# Patient Record
Sex: FEMALE | Race: WHITE | NOT HISPANIC OR LATINO | Employment: OTHER | URBAN - METROPOLITAN AREA
[De-identification: names, ages, dates, MRNs, and addresses within clinical notes are randomized per-mention and may not be internally consistent; named-entity substitution may affect disease eponyms.]

---

## 2017-08-03 ENCOUNTER — TRANSCRIBE ORDERS (OUTPATIENT)
Dept: ADMINISTRATIVE | Facility: HOSPITAL | Age: 67
End: 2017-08-03

## 2017-08-03 DIAGNOSIS — R19.7 DIARRHEA DUE TO MALABSORPTION: ICD-10-CM

## 2017-08-03 DIAGNOSIS — K90.9 DIARRHEA DUE TO MALABSORPTION: ICD-10-CM

## 2017-08-11 ENCOUNTER — HOSPITAL ENCOUNTER (OUTPATIENT)
Dept: ULTRASOUND IMAGING | Facility: HOSPITAL | Age: 67
Discharge: HOME | End: 2017-08-11
Attending: INTERNAL MEDICINE | Admitting: INTERNAL MEDICINE

## 2017-08-11 ENCOUNTER — HOSPITAL ENCOUNTER (OUTPATIENT)
Dept: ULTRASOUND IMAGING | Facility: HOSPITAL | Age: 67
Discharge: HOME | End: 2017-08-11
Attending: INTERNAL MEDICINE

## 2017-08-11 ENCOUNTER — HOSPITAL ENCOUNTER (OUTPATIENT)
Dept: GENERAL RADIOLOGY | Facility: HOSPITAL | Age: 67
Discharge: HOME | End: 2017-08-11
Attending: INTERNAL MEDICINE

## 2017-08-11 DIAGNOSIS — R19.7 DIARRHEA DUE TO MALABSORPTION: ICD-10-CM

## 2017-08-11 DIAGNOSIS — K90.9 DIARRHEA DUE TO MALABSORPTION: ICD-10-CM

## 2017-08-11 PROCEDURE — A9270 NON-COVERED ITEM OR SERVICE: HCPCS | Performed by: INTERNAL MEDICINE

## 2017-08-11 PROCEDURE — 74250 X-RAY XM SM INT 1CNTRST STD: CPT

## 2017-08-11 PROCEDURE — 63710000001 BARIUM SULFATE 96 % RECONSTITUTED SUSPENSION: Performed by: INTERNAL MEDICINE

## 2017-08-11 PROCEDURE — 76700 US EXAM ABDOM COMPLETE: CPT

## 2017-08-11 RX ADMIN — BARIUM SULFATE 240 ML: 960 POWDER, FOR SUSPENSION ORAL at 09:20

## 2018-05-10 ENCOUNTER — HOSPITAL ENCOUNTER (OUTPATIENT)
Dept: GENERAL RADIOLOGY | Facility: HOSPITAL | Age: 68
Discharge: HOME | End: 2018-05-10
Attending: INTERNAL MEDICINE | Admitting: INTERNAL MEDICINE

## 2018-05-10 ENCOUNTER — TRANSCRIBE ORDERS (OUTPATIENT)
Dept: ADMINISTRATIVE | Facility: HOSPITAL | Age: 68
End: 2018-05-10

## 2018-05-10 DIAGNOSIS — R06.02 SOB (SHORTNESS OF BREATH): ICD-10-CM

## 2018-05-10 DIAGNOSIS — M79.604 RIGHT LEG PAIN: Primary | ICD-10-CM

## 2018-05-10 PROCEDURE — 72110 X-RAY EXAM L-2 SPINE 4/>VWS: CPT

## 2018-05-10 PROCEDURE — 71046 X-RAY EXAM CHEST 2 VIEWS: CPT

## 2019-01-15 ENCOUNTER — TRANSCRIBE ORDERS (OUTPATIENT)
Dept: ADMINISTRATIVE | Facility: HOSPITAL | Age: 69
End: 2019-01-15

## 2019-01-15 ENCOUNTER — HOSPITAL ENCOUNTER (OUTPATIENT)
Dept: GENERAL RADIOLOGY | Facility: HOSPITAL | Age: 69
Discharge: HOME | End: 2019-01-15
Attending: INTERNAL MEDICINE | Admitting: INTERNAL MEDICINE

## 2019-01-15 DIAGNOSIS — J01.90 ACUTE SINUSITIS, RECURRENCE NOT SPECIFIED, UNSPECIFIED LOCATION: Primary | ICD-10-CM

## 2019-01-15 PROCEDURE — 70220 X-RAY EXAM OF SINUSES: CPT

## 2019-01-22 ENCOUNTER — OUTSIDE FACILITY SERVICE (OUTPATIENT)
Dept: GASTROENTEROLOGY | Facility: CLINIC | Age: 69
End: 2019-01-22

## 2019-01-22 ENCOUNTER — LAB REQUISITION (OUTPATIENT)
Dept: LAB | Facility: HOSPITAL | Age: 69
End: 2019-01-22

## 2019-01-22 DIAGNOSIS — R63.4 ABNORMAL WEIGHT LOSS: ICD-10-CM

## 2019-01-22 PROCEDURE — 43239 EGD BIOPSY SINGLE/MULTIPLE: CPT | Performed by: INTERNAL MEDICINE

## 2019-01-22 PROCEDURE — 88305 TISSUE EXAM BY PATHOLOGIST: CPT | Performed by: INTERNAL MEDICINE

## 2019-01-22 PROCEDURE — 43249 ESOPH EGD DILATION <30 MM: CPT | Performed by: INTERNAL MEDICINE

## 2019-01-23 LAB
CYTO UR: NORMAL
LAB AP CASE REPORT: NORMAL
LAB AP CLINICAL INFORMATION: NORMAL
PATH REPORT.FINAL DX SPEC: NORMAL
PATH REPORT.GROSS SPEC: NORMAL

## 2019-01-28 ENCOUNTER — TRANSCRIBE ORDERS (OUTPATIENT)
Dept: ADMINISTRATIVE | Facility: HOSPITAL | Age: 69
End: 2019-01-28

## 2019-06-03 ENCOUNTER — TRANSCRIBE ORDERS (OUTPATIENT)
Dept: ADMINISTRATIVE | Facility: HOSPITAL | Age: 69
End: 2019-06-03

## 2019-06-03 DIAGNOSIS — I35.1 MILD AI (AORTIC INCOMPETENCE): Primary | ICD-10-CM

## 2019-06-18 ENCOUNTER — OUTSIDE FACILITY SERVICE (OUTPATIENT)
Dept: GASTROENTEROLOGY | Facility: CLINIC | Age: 69
End: 2019-06-18

## 2019-06-18 ENCOUNTER — LAB REQUISITION (OUTPATIENT)
Dept: LAB | Facility: HOSPITAL | Age: 69
End: 2019-06-18

## 2019-06-18 DIAGNOSIS — K25.9 GASTRIC ULCER WITHOUT HEMORRHAGE OR PERFORATION: ICD-10-CM

## 2019-06-18 PROCEDURE — 43239 EGD BIOPSY SINGLE/MULTIPLE: CPT | Performed by: INTERNAL MEDICINE

## 2019-06-18 PROCEDURE — 88305 TISSUE EXAM BY PATHOLOGIST: CPT | Performed by: INTERNAL MEDICINE

## 2019-07-02 ENCOUNTER — HOSPITAL ENCOUNTER (OUTPATIENT)
Dept: CARDIOLOGY | Facility: HOSPITAL | Age: 69
Discharge: HOME | End: 2019-07-02
Admitting: INTERNAL MEDICINE

## 2019-07-02 VITALS
WEIGHT: 145 LBS | SYSTOLIC BLOOD PRESSURE: 108 MMHG | BODY MASS INDEX: 24.16 KG/M2 | HEIGHT: 65 IN | DIASTOLIC BLOOD PRESSURE: 65 MMHG

## 2019-07-02 DIAGNOSIS — I35.1 MILD AI (AORTIC INCOMPETENCE): ICD-10-CM

## 2019-07-02 LAB
BH CV ECHO MEAS - AO MAX PG (FULL): 1.1 MMHG
BH CV ECHO MEAS - AO MAX PG: 8 MMHG
BH CV ECHO MEAS - AO MEAN PG (FULL): 1 MMHG
BH CV ECHO MEAS - AO MEAN PG: 4 MMHG
BH CV ECHO MEAS - AO ROOT AREA (BSA CORRECTED): 1.9
BH CV ECHO MEAS - AO ROOT AREA: 8 CM^2
BH CV ECHO MEAS - AO ROOT DIAM: 3.2 CM
BH CV ECHO MEAS - AO V2 MAX: 141 CM/SEC
BH CV ECHO MEAS - AO V2 MEAN: 96.5 CM/SEC
BH CV ECHO MEAS - AO V2 VTI: 33.8 CM
BH CV ECHO MEAS - ASC AORTA: 3 CM
BH CV ECHO MEAS - AVA(I,A): 2.8 CM^2
BH CV ECHO MEAS - AVA(I,D): 2.8 CM^2
BH CV ECHO MEAS - AVA(V,A): 2.9 CM^2
BH CV ECHO MEAS - AVA(V,D): 2.9 CM^2
BH CV ECHO MEAS - BSA(HAYCOCK): 1.7 M^2
BH CV ECHO MEAS - BSA: 1.7 M^2
BH CV ECHO MEAS - BZI_BMI: 24.1 KILOGRAMS/M^2
BH CV ECHO MEAS - BZI_METRIC_HEIGHT: 165.1 CM
BH CV ECHO MEAS - BZI_METRIC_WEIGHT: 65.8 KG
BH CV ECHO MEAS - EDV(CUBED): 57.1 ML
BH CV ECHO MEAS - EDV(MOD-SP2): 65 ML
BH CV ECHO MEAS - EDV(MOD-SP4): 62 ML
BH CV ECHO MEAS - EDV(TEICH): 63.9 ML
BH CV ECHO MEAS - EF(CUBED): 73.6 %
BH CV ECHO MEAS - EF(MOD-BP): 67 %
BH CV ECHO MEAS - EF(MOD-SP2): 64.6 %
BH CV ECHO MEAS - EF(MOD-SP4): 71 %
BH CV ECHO MEAS - EF(TEICH): 66.1 %
BH CV ECHO MEAS - ESV(CUBED): 15.1 ML
BH CV ECHO MEAS - ESV(MOD-SP2): 23 ML
BH CV ECHO MEAS - ESV(MOD-SP4): 18 ML
BH CV ECHO MEAS - ESV(TEICH): 21.7 ML
BH CV ECHO MEAS - FS: 35.8 %
BH CV ECHO MEAS - IVS/LVPW: 1.1
BH CV ECHO MEAS - IVSD: 0.99 CM
BH CV ECHO MEAS - LA DIMENSION: 3.8 CM
BH CV ECHO MEAS - LA/AO: 1.2
BH CV ECHO MEAS - LAD MAJOR: 5.4 CM
BH CV ECHO MEAS - LAT PEAK E' VEL: 8.1 CM/SEC
BH CV ECHO MEAS - LATERAL E/E' RATIO: 13.1
BH CV ECHO MEAS - LV DIASTOLIC VOL/BSA (35-75): 35.9 ML/M^2
BH CV ECHO MEAS - LV MASS(C)D: 113.8 GRAMS
BH CV ECHO MEAS - LV MASS(C)DI: 65.9 GRAMS/M^2
BH CV ECHO MEAS - LV MAX PG: 6.9 MMHG
BH CV ECHO MEAS - LV MEAN PG: 3 MMHG
BH CV ECHO MEAS - LV SYSTOLIC VOL/BSA (12-30): 10.4 ML/M^2
BH CV ECHO MEAS - LV V1 MAX: 131 CM/SEC
BH CV ECHO MEAS - LV V1 MEAN: 77 CM/SEC
BH CV ECHO MEAS - LV V1 VTI: 30 CM
BH CV ECHO MEAS - LVIDD: 3.9 CM
BH CV ECHO MEAS - LVIDS: 2.5 CM
BH CV ECHO MEAS - LVLD AP2: 7.3 CM
BH CV ECHO MEAS - LVLD AP4: 6 CM
BH CV ECHO MEAS - LVLS AP2: 5.8 CM
BH CV ECHO MEAS - LVLS AP4: 4.6 CM
BH CV ECHO MEAS - LVOT AREA (M): 3.1 CM^2
BH CV ECHO MEAS - LVOT AREA: 3.1 CM^2
BH CV ECHO MEAS - LVOT DIAM: 2 CM
BH CV ECHO MEAS - LVPWD: 0.94 CM
BH CV ECHO MEAS - MED PEAK E' VEL: 6.6 CM/SEC
BH CV ECHO MEAS - MEDIAL E/E' RATIO: 16.1
BH CV ECHO MEAS - MV A MAX VEL: 90.3 CM/SEC
BH CV ECHO MEAS - MV DEC TIME: 0.17 SEC
BH CV ECHO MEAS - MV E MAX VEL: 106 CM/SEC
BH CV ECHO MEAS - MV E/A: 1.2
BH CV ECHO MEAS - PA ACC SLOPE: 522 CM/SEC^2
BH CV ECHO MEAS - PA ACC TIME: 0.13 SEC
BH CV ECHO MEAS - PA MAX PG: 3 MMHG
BH CV ECHO MEAS - PA PR(ACCEL): 18.7 MMHG
BH CV ECHO MEAS - PA V2 MAX: 86.2 CM/SEC
BH CV ECHO MEAS - RAP SYSTOLE: 8 MMHG
BH CV ECHO MEAS - RVDD: 2 CM
BH CV ECHO MEAS - RVSP: 26 MMHG
BH CV ECHO MEAS - SI(AO): 157.5 ML/M^2
BH CV ECHO MEAS - SI(CUBED): 24.3 ML/M^2
BH CV ECHO MEAS - SI(LVOT): 54.6 ML/M^2
BH CV ECHO MEAS - SI(MOD-SP2): 24.3 ML/M^2
BH CV ECHO MEAS - SI(MOD-SP4): 25.5 ML/M^2
BH CV ECHO MEAS - SI(TEICH): 24.5 ML/M^2
BH CV ECHO MEAS - SV(AO): 271.8 ML
BH CV ECHO MEAS - SV(CUBED): 42 ML
BH CV ECHO MEAS - SV(LVOT): 94.2 ML
BH CV ECHO MEAS - SV(MOD-SP2): 42 ML
BH CV ECHO MEAS - SV(MOD-SP4): 44 ML
BH CV ECHO MEAS - SV(TEICH): 42.3 ML
BH CV ECHO MEAS - TAPSE (>1.6): 3.1 CM2
BH CV ECHO MEAS - TR MAX PG: 18 MMHG
BH CV ECHO MEAS - TR MAX VEL: 210.5 CM/SEC
BH CV ECHO MEAS - TV MAX PG: 0.98 MMHG
BH CV ECHO MEAS - TV V2 MAX: 49.4 CM/SEC
BH CV ECHO MEASUREMENTS AVERAGE E/E' RATIO: 14.42
BH CV VAS BP RIGHT ARM: NORMAL MMHG
BH CV XLRA - RV BASE: 2.9 CM
BH CV XLRA - RV LENGTH: 5.2 CM
BH CV XLRA - RV MID: 3 CM
BH CV XLRA - TDI S': 15.2 CM/SEC

## 2019-07-02 PROCEDURE — 93306 TTE W/DOPPLER COMPLETE: CPT | Performed by: INTERNAL MEDICINE

## 2019-07-02 PROCEDURE — 93306 TTE W/DOPPLER COMPLETE: CPT

## 2020-05-22 ENCOUNTER — TRANSCRIBE ORDERS (OUTPATIENT)
Dept: ADMINISTRATIVE | Facility: HOSPITAL | Age: 70
End: 2020-05-22

## 2020-05-22 ENCOUNTER — HOSPITAL ENCOUNTER (OUTPATIENT)
Dept: GENERAL RADIOLOGY | Facility: HOSPITAL | Age: 70
Discharge: HOME | End: 2020-05-22
Admitting: INTERNAL MEDICINE

## 2020-05-22 DIAGNOSIS — R05.9 COUGH: Primary | ICD-10-CM

## 2020-05-22 DIAGNOSIS — M81.0 AGE-RELATED OSTEOPOROSIS WITHOUT CURRENT PATHOLOGICAL FRACTURE: Primary | ICD-10-CM

## 2020-05-22 PROCEDURE — 71046 X-RAY EXAM CHEST 2 VIEWS: CPT

## 2020-06-09 ENCOUNTER — TRANSCRIBE ORDERS (OUTPATIENT)
Dept: ADMINISTRATIVE | Facility: HOSPITAL | Age: 70
End: 2020-06-09

## 2020-06-09 ENCOUNTER — HOSPITAL ENCOUNTER (OUTPATIENT)
Dept: GENERAL RADIOLOGY | Facility: HOSPITAL | Age: 70
Discharge: HOME | End: 2020-06-09
Admitting: INTERNAL MEDICINE

## 2020-06-09 DIAGNOSIS — R60.9 EDEMA, UNSPECIFIED TYPE: Primary | ICD-10-CM

## 2020-06-09 PROCEDURE — 73130 X-RAY EXAM OF HAND: CPT

## 2020-07-15 ENCOUNTER — APPOINTMENT (OUTPATIENT)
Dept: PREADMISSION TESTING | Facility: HOSPITAL | Age: 70
End: 2020-07-15

## 2020-07-15 LAB
REF LAB TEST METHOD: NORMAL
SARS-COV-2 RNA RESP QL NAA+PROBE: NOT DETECTED

## 2020-07-15 PROCEDURE — U0004 COV-19 TEST NON-CDC HGH THRU: HCPCS

## 2020-07-15 PROCEDURE — C9803 HOPD COVID-19 SPEC COLLECT: HCPCS

## 2020-07-15 PROCEDURE — U0002 COVID-19 LAB TEST NON-CDC: HCPCS

## 2020-07-15 RX ORDER — SODIUM, POTASSIUM,MAG SULFATES 17.5-3.13G
2 SOLUTION, RECONSTITUTED, ORAL ORAL TAKE AS DIRECTED
Qty: 354 ML | Refills: 0 | Status: SHIPPED | OUTPATIENT
Start: 2020-07-15 | End: 2022-03-08

## 2020-07-17 ENCOUNTER — LAB REQUISITION (OUTPATIENT)
Dept: LAB | Facility: HOSPITAL | Age: 70
End: 2020-07-17

## 2020-07-17 ENCOUNTER — OUTSIDE FACILITY SERVICE (OUTPATIENT)
Dept: GASTROENTEROLOGY | Facility: CLINIC | Age: 70
End: 2020-07-17

## 2020-07-17 DIAGNOSIS — Z12.11 ENCOUNTER FOR SCREENING FOR MALIGNANT NEOPLASM OF COLON: ICD-10-CM

## 2020-07-17 PROCEDURE — 45385 COLONOSCOPY W/LESION REMOVAL: CPT | Performed by: INTERNAL MEDICINE

## 2020-07-17 PROCEDURE — 88305 TISSUE EXAM BY PATHOLOGIST: CPT | Performed by: INTERNAL MEDICINE

## 2020-07-17 PROCEDURE — 43239 EGD BIOPSY SINGLE/MULTIPLE: CPT | Performed by: INTERNAL MEDICINE

## 2020-07-17 RX ORDER — OMEPRAZOLE 40 MG/1
40 CAPSULE, DELAYED RELEASE ORAL
Qty: 60 CAPSULE | Refills: 11 | Status: SHIPPED | OUTPATIENT
Start: 2020-07-17

## 2020-07-27 DIAGNOSIS — Z00.6 EXAMINATION FOR NORMAL COMPARISON FOR CLINICAL RESEARCH: Primary | ICD-10-CM

## 2020-07-29 ENCOUNTER — OFFICE VISIT (OUTPATIENT)
Dept: PULMONOLOGY | Facility: CLINIC | Age: 70
End: 2020-07-29

## 2020-07-29 VITALS
SYSTOLIC BLOOD PRESSURE: 120 MMHG | RESPIRATION RATE: 16 BRPM | BODY MASS INDEX: 31.45 KG/M2 | TEMPERATURE: 96.8 F | HEART RATE: 75 BPM | WEIGHT: 189 LBS | DIASTOLIC BLOOD PRESSURE: 80 MMHG | OXYGEN SATURATION: 98 %

## 2020-07-29 DIAGNOSIS — Z87.891 FORMER SMOKER: ICD-10-CM

## 2020-07-29 DIAGNOSIS — R06.09 DYSPNEA ON EXERTION: ICD-10-CM

## 2020-07-29 DIAGNOSIS — K21.9 CHRONIC GERD: ICD-10-CM

## 2020-07-29 DIAGNOSIS — R91.8 GROUND GLASS OPACITY PRESENT ON IMAGING OF LUNG: Primary | ICD-10-CM

## 2020-07-29 DIAGNOSIS — M33.20 POLYMYOSITIS (HCC): ICD-10-CM

## 2020-07-29 DIAGNOSIS — I51.89 DIASTOLIC DYSFUNCTION: ICD-10-CM

## 2020-07-29 PROBLEM — K25.3 ACUTE GASTRIC ULCER WITHOUT HEMORRHAGE OR PERFORATION: Status: ACTIVE | Noted: 2020-07-29

## 2020-07-29 LAB
ALBUMIN SERPL-MCNC: 3.4 G/DL (ref 3.5–5.2)
ALBUMIN/GLOB SERPL: 1 G/DL
ALP SERPL-CCNC: 49 U/L (ref 39–117)
ALT SERPL W P-5'-P-CCNC: 109 U/L (ref 1–33)
ANION GAP SERPL CALCULATED.3IONS-SCNC: 8.9 MMOL/L (ref 5–15)
AST SERPL-CCNC: 80 U/L (ref 1–32)
BASOPHILS # BLD AUTO: 0.13 10*3/MM3 (ref 0–0.2)
BASOPHILS NFR BLD AUTO: 0.7 % (ref 0–1.5)
BILIRUB SERPL-MCNC: 0.3 MG/DL (ref 0–1.2)
BUN SERPL-MCNC: 13 MG/DL (ref 8–23)
BUN/CREAT SERPL: 21 (ref 7–25)
CALCIUM SPEC-SCNC: 9.2 MG/DL (ref 8.6–10.5)
CHLORIDE SERPL-SCNC: 99 MMOL/L (ref 98–107)
CO2 SERPL-SCNC: 25.1 MMOL/L (ref 22–29)
CREAT SERPL-MCNC: 0.62 MG/DL (ref 0.57–1)
DEPRECATED RDW RBC AUTO: 48.4 FL (ref 37–54)
EOSINOPHIL # BLD AUTO: 0.33 10*3/MM3 (ref 0–0.4)
EOSINOPHIL NFR BLD AUTO: 1.7 % (ref 0.3–6.2)
ERYTHROCYTE [DISTWIDTH] IN BLOOD BY AUTOMATED COUNT: 14.5 % (ref 12.3–15.4)
GFR SERPL CREATININE-BSD FRML MDRD: 95 ML/MIN/1.73
GLOBULIN UR ELPH-MCNC: 3.3 GM/DL
GLUCOSE SERPL-MCNC: 84 MG/DL (ref 65–99)
HCT VFR BLD AUTO: 39 % (ref 34–46.6)
HGB BLD-MCNC: 13.3 G/DL (ref 12–15.9)
IMM GRANULOCYTES # BLD AUTO: 0.25 10*3/MM3 (ref 0–0.05)
IMM GRANULOCYTES NFR BLD AUTO: 1.3 % (ref 0–0.5)
LYMPHOCYTES # BLD AUTO: 1.24 10*3/MM3 (ref 0.7–3.1)
LYMPHOCYTES NFR BLD AUTO: 6.5 % (ref 19.6–45.3)
MCH RBC QN AUTO: 31.4 PG (ref 26.6–33)
MCHC RBC AUTO-ENTMCNC: 34.1 G/DL (ref 31.5–35.7)
MCV RBC AUTO: 92 FL (ref 79–97)
MONOCYTES # BLD AUTO: 1.83 10*3/MM3 (ref 0.1–0.9)
MONOCYTES NFR BLD AUTO: 9.5 % (ref 5–12)
NEUTROPHILS NFR BLD AUTO: 15.44 10*3/MM3 (ref 1.7–7)
NEUTROPHILS NFR BLD AUTO: 80.3 % (ref 42.7–76)
NRBC BLD AUTO-RTO: 0 /100 WBC (ref 0–0.2)
PLATELET # BLD AUTO: 316 10*3/MM3 (ref 140–450)
PMV BLD AUTO: 11.5 FL (ref 6–12)
POTASSIUM SERPL-SCNC: 4.2 MMOL/L (ref 3.5–5.2)
PROT SERPL-MCNC: 6.7 G/DL (ref 6–8.5)
RBC # BLD AUTO: 4.24 10*6/MM3 (ref 3.77–5.28)
SODIUM SERPL-SCNC: 133 MMOL/L (ref 136–145)
WBC # BLD AUTO: 19.22 10*3/MM3 (ref 3.4–10.8)

## 2020-07-29 PROCEDURE — 86003 ALLG SPEC IGE CRUDE XTRC EA: CPT | Performed by: INTERNAL MEDICINE

## 2020-07-29 PROCEDURE — 99204 OFFICE O/P NEW MOD 45 MIN: CPT | Performed by: INTERNAL MEDICINE

## 2020-07-29 PROCEDURE — 83520 IMMUNOASSAY QUANT NOS NONAB: CPT | Performed by: INTERNAL MEDICINE

## 2020-07-29 PROCEDURE — 86606 ASPERGILLUS ANTIBODY: CPT | Performed by: INTERNAL MEDICINE

## 2020-07-29 PROCEDURE — 80053 COMPREHEN METABOLIC PANEL: CPT | Performed by: INTERNAL MEDICINE

## 2020-07-29 PROCEDURE — 82785 ASSAY OF IGE: CPT | Performed by: INTERNAL MEDICINE

## 2020-07-29 PROCEDURE — 86609 BACTERIUM ANTIBODY: CPT | Performed by: INTERNAL MEDICINE

## 2020-07-29 PROCEDURE — 86256 FLUORESCENT ANTIBODY TITER: CPT | Performed by: INTERNAL MEDICINE

## 2020-07-29 PROCEDURE — 86602 ANTINOMYCES ANTIBODY: CPT | Performed by: INTERNAL MEDICINE

## 2020-07-29 PROCEDURE — 85025 COMPLETE CBC W/AUTO DIFF WBC: CPT | Performed by: INTERNAL MEDICINE

## 2020-07-29 PROCEDURE — 86671 FUNGUS NES ANTIBODY: CPT | Performed by: INTERNAL MEDICINE

## 2020-07-29 PROCEDURE — 36415 COLL VENOUS BLD VENIPUNCTURE: CPT | Performed by: INTERNAL MEDICINE

## 2020-07-29 PROCEDURE — 86331 IMMUNODIFFUSION OUCHTERLONY: CPT | Performed by: INTERNAL MEDICINE

## 2020-07-29 RX ORDER — DESVENLAFAXINE SUCCINATE 50 MG/1
50 TABLET, EXTENDED RELEASE ORAL DAILY
COMMUNITY
Start: 2020-06-22

## 2020-07-29 RX ORDER — ANTIARTHRITIC COMBINATION NO.2 900 MG
1 TABLET ORAL DAILY
COMMUNITY
Start: 2013-06-01

## 2020-07-29 RX ORDER — CHOLECALCIFEROL (VITAMIN D3) 125 MCG
100 CAPSULE ORAL DAILY
COMMUNITY
Start: 2000-01-01

## 2020-07-29 RX ORDER — FLUTICASONE PROPIONATE 50 MCG
2 SPRAY, SUSPENSION (ML) NASAL DAILY
COMMUNITY
Start: 2019-01-25

## 2020-07-29 RX ORDER — FOLIC ACID 1 MG/1
1 TABLET ORAL DAILY
COMMUNITY
Start: 2020-07-02 | End: 2022-03-08

## 2020-07-29 RX ORDER — LEVOTHYROXINE SODIUM 88 UG/1
88 TABLET ORAL DAILY
COMMUNITY
Start: 2020-04-24

## 2020-07-29 RX ORDER — PREDNISONE 20 MG/1
20 TABLET ORAL DAILY
COMMUNITY
Start: 2020-07-02 | End: 2020-09-03 | Stop reason: DRUGHIGH

## 2020-07-29 RX ORDER — GABAPENTIN 600 MG/1
600 TABLET ORAL DAILY
COMMUNITY
Start: 2020-06-16

## 2020-07-29 RX ORDER — TRIAMTERENE AND HYDROCHLOROTHIAZIDE 37.5; 25 MG/1; MG/1
1 TABLET ORAL DAILY
COMMUNITY
Start: 2020-07-09

## 2020-07-29 NOTE — PROGRESS NOTES
PULMONARY  NOTE    Chief Complaint     Polymyositis, remote smoker, groundglass infiltrates on CT scan, diastolic dysfunction, gastric ulcer, reflux    History of Present Illness     70-year-old white female referred for evaluation of an abnormal CT scan of the chest.    She has recently been diagnosed with polymyositis by Dr. Hopper.  She is on methotrexate and prednisone, currently 20 mg of prednisone a day.    She underwent a CT scan of the chest at the [facility] that revealed patchy bilateral groundglass infiltrates.  A previous CT scan of the chest done at [facility] in 2018 was reportedly normal although I do not have those scans for direct comparison, yet.    She has no cough or sputum production.  She is never had hemoptysis.    She does have dyspnea on exertion, not at rest.  She also complains of fatigue.  She would have to stop and rest going up 1 flight of stairs.    She denies fevers or chills or weight loss.    She denies regular reflux symptoms.  However she recently underwent an upper endoscopy and colonoscopy by Dr. Aldana and was found to have a gastric ulcer although she has been asymptomatic from that standpoint.  She is been placed on omeprazole.  She does not follow reflux precautions on a regular basis.    Transthoracic echocardiogram done last year revealed grade 2 diastolic dysfunction with left atrial enlargement.  RVSP was estimated at less than 30 mmHg    Patient Active Problem List   Diagnosis   • Polymyositis (CMS/HCC)   • Ground glass opacities on Chest CT   • GERD   • Remote smoker (None since 1993)   • Diastolic dysfunction   • Dyspnea on exertion   • Gastic ulcer     Allergies   Allergen Reactions   • Ciprofloxacin Mental Status Change       Current Outpatient Medications:   •  Biotin 5000 MCG tablet, , Disp: , Rfl:   •  calcium carb-cholecalciferol (Calcium 600+D) 600-800 MG-UNIT tablet, , Disp: , Rfl:   •  Coenzyme Q-10 100 MG capsule, Take 100 mg by mouth  Daily., Disp: , Rfl:   •  desvenlafaxine (PRISTIQ) 50 MG 24 hr tablet, Take 50 mg by mouth Daily., Disp: , Rfl:   •  fluticasone (FLONASE) 50 MCG/ACT nasal spray, 2 sprays into the nostril(s) as directed by provider Daily., Disp: , Rfl:   •  folic acid (FOLVITE) 1 MG tablet, Take 1 mg by mouth Daily., Disp: , Rfl:   •  gabapentin (NEURONTIN) 600 MG tablet, Take 600 mg by mouth Daily., Disp: , Rfl:   •  levothyroxine (SYNTHROID, LEVOTHROID) 88 MCG tablet, Take 88 mcg by mouth Daily., Disp: , Rfl:   •  methotrexate 2.5 MG tablet, Take 2.5 mg by mouth 1 (One) Time Per Week. 6 tablets weekly, Disp: , Rfl:   •  Multiple Vitamins-Minerals (CENTRUM SILVER 50+WOMEN) tablet, , Disp: , Rfl:   •  omeprazole (priLOSEC) 40 MG capsule, Take 1 capsule by mouth 2 (Two) Times a Day Before Meals. Take a half hour before breakfast, Disp: 60 capsule, Rfl: 11  •  predniSONE (DELTASONE) 20 MG tablet, Take 20 mg by mouth Daily., Disp: , Rfl:   •  triamterene-hydrochlorothiazide (MAXZIDE-25) 37.5-25 MG per tablet, Take 1 tablet by mouth Daily., Disp: , Rfl:   •  sodium-potassium-magnesium sulfates (Suprep Bowel Prep Kit) 17.5-3.13-1.6 GM/177ML solution oral solution, Take 2 bottles by mouth Take As Directed. Do Not Eat The Day Before Your Procedure. Call [phone] for questions., Disp: 354 mL, Rfl: 0  MEDICATION LIST AND ALLERGIES REVIEWED.    Family History   Problem Relation Age of Onset   • Alzheimer's disease Mother    • COPD Father    • Heart failure Father    • Diabetes Maternal Grandmother    • Heart failure Maternal Grandfather      Social History     Tobacco Use   • Smoking status: Former Smoker     Packs/day: 2.00     Years: 15.00     Pack years: 30.00     Types: Cigarettes     Last attempt to quit: 1993     Years since quittin.0   • Smokeless tobacco: Never Used   Substance Use Topics   • Alcohol use: Yes     Drinks per session: 1 or 2     Binge frequency: Weekly   • Drug use: Never     Social History     Social  History Narrative    Single    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    Review of Systems  ALSO REFER TO SCANNED ROS SHEET FROM SAME DATE.    /80 (BP Location: Left arm, Patient Position: Sitting, Cuff Size: Adult)   Pulse 75   Temp 96.8 °F (36 °C)   Resp 16   Wt 85.7 kg (189 lb)   SpO2 98% Comment: room air at rest  BMI 31.45 kg/m²   Physical Exam   Constitutional: She is oriented to person, place, and time. She appears well-developed. No distress.   HENT:   Head: Normocephalic and atraumatic.   Neck: No thyromegaly present.   Cardiovascular: Normal rate, regular rhythm and normal heart sounds.   No murmur heard.  Pulmonary/Chest: Effort normal and breath sounds normal. No stridor.   Abdominal: Soft. Bowel sounds are normal.   Musculoskeletal: Normal range of motion. She exhibits no edema.   Lymphadenopathy:     She has no cervical adenopathy.        Right: No supraclavicular and no epitrochlear adenopathy present.        Left: No supraclavicular and no epitrochlear adenopathy present.   Neurological: She is alert and oriented to person, place, and time.   Skin: Skin is warm and dry. She is not diaphoretic.   Psychiatric: She has a normal mood and affect. Her behavior is normal.   Nursing note and vitals reviewed.      Results     CT scan of the chest from the [facility] reviewed on PACS.  Patchy groundglass infiltrates bilaterally without consolidation, mass, or adenopathy    Problem List       ICD-10-CM ICD-9-CM   1. Ground glass opacities on Chest CT R91.8 793.19   2. Polymyositis (CMS/HCC) M33.20 710.4   3. GERD K21.9 530.81   4. Diastolic dysfunction I51.89 429.9   5. Dyspnea on exertion R06.00 786.09   6. Remote smoker (None since 1993) Z87.891  V15.82       Discussion     She has groundglass changes chest imaging that very well may be related to her diagnosis of polymyositis.  Other considerations would include hypersensitivity pneumonitis, chronic diastolic heart failure, infection.  Less likely malignancy    Assuming this is related to her polymyositis, she is already on disease modifying medication which should result in improvement in chest findings assuming it is effective at controlling her disease systemically.    We will obtain previous scans from 2019 to compare  We will get labs today including hypersensitivity pneumonitis panel.    We discussed reflux precautions and I gave her reflux information sheet.    She had significant diastolic dysfunction on last year's echocardiogram.  This probably bears further follow-up.  She does not have evidence of decompensated heart failure on today's exam, however.    At the very least, I recommend repeat chest imaging in about 3 months.    I will plan to see her back after the above    Edwardo Drake MD  Note electronically signed    CC: Devon Lantigua MD

## 2020-07-30 DIAGNOSIS — Z00.6 EXAMINATION FOR NORMAL COMPARISON FOR CLINICAL RESEARCH: Primary | ICD-10-CM

## 2020-07-31 LAB
C-ANCA TITR SER IF: NORMAL TITER
MYELOPEROXIDASE AB SER-ACNC: <9 U/ML (ref 0–9)
P-ANCA ATYPICAL TITR SER IF: NORMAL TITER
P-ANCA TITR SER IF: NORMAL TITER
PROTEINASE3 AB SER IA-ACNC: <3.5 U/ML (ref 0–3.5)

## 2020-08-01 LAB
A ALTERNATA IGE QN: <0.1 KU/L
A FUMIGATUS IGE QN: <0.1 KU/L
AMER ROACH IGE QN: <0.1 KU/L
BAHIA GRASS IGE QN: <0.1 KU/L
BERMUDA GRASS IGE QN: <0.1 KU/L
BOXELDER IGE QN: <0.1 KU/L
C HERBARUM IGE QN: <0.1 KU/L
CAT DANDER IGG QN: <0.1 KU/L
CMN PIGWEED IGE QN: <0.1 KU/L
COMMON RAGWEED IGE QN: <0.1 KU/L
CONV CLASS DESCRIPTION: NORMAL
D FARINAE IGE QN: <0.1 KU/L
D PTERONYSS IGE QN: <0.1 KU/L
DOG DANDER IGE QN: <0.1 KU/L
ENGL PLANTAIN IGE QN: <0.1 KU/L
HAZELNUT POLN IGE QN: <0.1 KU/L
JOHNSON GRASS IGE QN: <0.1 KU/L
KENT BLUE GRASS IGE QN: <0.1 KU/L
M RACEMOSUS IGE QN: <0.1 KU/L
MT JUNIPER IGE QN: <0.1 KU/L
MUGWORT IGE QN: <0.1 KU/L
NETTLE IGE QN: <0.1 KU/L
P NOTATUM IGE QN: <0.1 KU/L
S BOTRYOSUM IGE QN: <0.1 KU/L
SHEEP SORREL IGE QN: <0.1 KU/L
SWEET GUM IGE QN: <0.1 KU/L
T011-IGE MAPLE LEAF SYCAMORE: <0.1 KU/L
WHITE ELM IGE QN: <0.1 KU/L
WHITE HICKORY IGE QN: <0.1 KU/L
WHITE MULBERRY IGE QN: <0.1 KU/L
WHITE OAK IGE QN: <0.1 KU/L

## 2020-08-02 LAB — TOTAL IGE SMQN RAST: 26 IU/ML (ref 6–495)

## 2020-08-04 ENCOUNTER — TELEPHONE (OUTPATIENT)
Dept: PULMONOLOGY | Facility: CLINIC | Age: 70
End: 2020-08-04

## 2020-08-04 DIAGNOSIS — R06.09 DYSPNEA ON EXERTION: Primary | ICD-10-CM

## 2020-08-04 NOTE — TELEPHONE ENCOUNTER
Spoke with pt and asked her to come into the office (no apt needed) to repeat lab hypersensitivity pneumonitis per Gloria. Pt verbalized understanding.

## 2020-08-05 ENCOUNTER — LAB (OUTPATIENT)
Dept: PULMONOLOGY | Facility: CLINIC | Age: 70
End: 2020-08-05

## 2020-08-05 DIAGNOSIS — R06.09 DYSPNEA ON EXERTION: ICD-10-CM

## 2020-08-05 PROCEDURE — 86671 FUNGUS NES ANTIBODY: CPT | Performed by: INTERNAL MEDICINE

## 2020-08-05 PROCEDURE — 86602 ANTINOMYCES ANTIBODY: CPT | Performed by: INTERNAL MEDICINE

## 2020-08-05 PROCEDURE — 86331 IMMUNODIFFUSION OUCHTERLONY: CPT | Performed by: INTERNAL MEDICINE

## 2020-08-05 PROCEDURE — 86609 BACTERIUM ANTIBODY: CPT | Performed by: INTERNAL MEDICINE

## 2020-08-05 PROCEDURE — 86606 ASPERGILLUS ANTIBODY: CPT | Performed by: INTERNAL MEDICINE

## 2020-08-10 LAB
A FUMIGATUS1 AB SER QL ID: NEGATIVE
A PULLULANS AB SER QL: NEGATIVE
LACEYELLA SACCHARI AB SER QL: NEGATIVE
MICROPOLYSPORA FAENI: NEGATIVE
PIGEON SERUM AB QL ID: NEGATIVE
T VULGARIS AB SER QL ID: NEGATIVE

## 2020-08-19 ENCOUNTER — HOSPITAL ENCOUNTER (OUTPATIENT)
Dept: BONE DENSITY | Facility: HOSPITAL | Age: 70
Discharge: HOME | End: 2020-08-19
Admitting: INTERNAL MEDICINE

## 2020-08-19 DIAGNOSIS — M81.0 AGE-RELATED OSTEOPOROSIS WITHOUT CURRENT PATHOLOGICAL FRACTURE: ICD-10-CM

## 2020-08-19 PROCEDURE — 77080 DXA BONE DENSITY AXIAL: CPT

## 2020-09-01 ENCOUNTER — LAB (OUTPATIENT)
Dept: PULMONOLOGY | Facility: CLINIC | Age: 70
End: 2020-09-01

## 2020-09-01 DIAGNOSIS — Z01.812 BLOOD TESTS PRIOR TO TREATMENT OR PROCEDURE: Primary | ICD-10-CM

## 2020-09-01 PROCEDURE — U0004 COV-19 TEST NON-CDC HGH THRU: HCPCS | Performed by: NURSE PRACTITIONER

## 2020-09-01 PROCEDURE — U0002 COVID-19 LAB TEST NON-CDC: HCPCS | Performed by: NURSE PRACTITIONER

## 2020-09-02 LAB
REF LAB TEST METHOD: NORMAL
SARS-COV-2 RNA RESP QL NAA+PROBE: NOT DETECTED

## 2020-09-03 ENCOUNTER — OFFICE VISIT (OUTPATIENT)
Dept: PULMONOLOGY | Facility: CLINIC | Age: 70
End: 2020-09-03

## 2020-09-03 VITALS
WEIGHT: 191 LBS | HEART RATE: 65 BPM | DIASTOLIC BLOOD PRESSURE: 76 MMHG | BODY MASS INDEX: 30.7 KG/M2 | OXYGEN SATURATION: 98 % | SYSTOLIC BLOOD PRESSURE: 128 MMHG | HEIGHT: 66 IN | RESPIRATION RATE: 16 BRPM | TEMPERATURE: 96.6 F

## 2020-09-03 DIAGNOSIS — K21.9 CHRONIC GERD: ICD-10-CM

## 2020-09-03 DIAGNOSIS — R91.8 GROUND GLASS OPACITY PRESENT ON IMAGING OF LUNG: ICD-10-CM

## 2020-09-03 DIAGNOSIS — M33.20 POLYMYOSITIS (HCC): ICD-10-CM

## 2020-09-03 DIAGNOSIS — R06.09 DYSPNEA ON EXERTION: ICD-10-CM

## 2020-09-03 DIAGNOSIS — R06.00 DYSPNEA, UNSPECIFIED TYPE: Primary | ICD-10-CM

## 2020-09-03 PROCEDURE — 94726 PLETHYSMOGRAPHY LUNG VOLUMES: CPT | Performed by: NURSE PRACTITIONER

## 2020-09-03 PROCEDURE — 94729 DIFFUSING CAPACITY: CPT | Performed by: NURSE PRACTITIONER

## 2020-09-03 PROCEDURE — 99214 OFFICE O/P EST MOD 30 MIN: CPT | Performed by: NURSE PRACTITIONER

## 2020-09-03 PROCEDURE — 94375 RESPIRATORY FLOW VOLUME LOOP: CPT | Performed by: NURSE PRACTITIONER

## 2020-09-03 RX ORDER — PREDNISONE 1 MG/1
3 TABLET ORAL DAILY
COMMUNITY
Start: 2020-08-25 | End: 2022-12-20

## 2020-09-03 NOTE — PROGRESS NOTES
[facility] Pulmonary Follow up    CHIEF COMPLAINT    Dyspnea    HISTORY OF PRESENT ILLNESS    Malou Raymond is a 70 y.o.female here today for follow-up of her dyspnea.  She was last seen in the office at the end of July by Dr. Drake.  She denies any respiratory illnesses or exacerbations since her last appointment.    She was referred to our office for groundglass opacities on her CT scan of the chest.  She was diagnosed with polymyositis and is currently undergoing treatment.  She is currently on 15 mg of prednisone daily and 8 tablets of methotrexate weekly.  She does follow-up with Dr. Hopper in the near future.    She states that she does have some mild shortness of breath with activity and when climbing incline.  She does state that her breathing has improved since the end of July.    She denies fever, chills, sputum production, hemoptysis, night sweats, weight loss, chest pain or palpitations.  She denies any lower extremity edema or calf tenderness.  She states that her hands and feet have been edematous since her last appointment but this is improving as well.  She denies any sinus or allergy symptoms.  She does take Flonase on a regular basis.  She denies reflux symptoms and takes omeprazole twice a day.    She is up-to-date on her current vaccinations.    Patient Active Problem List   Diagnosis   • Polymyositis (CMS/HCC)   • Ground glass opacities on Chest CT   • GERD   • Remote smoker (None since 1993)   • Diastolic dysfunction   • Dyspnea on exertion   • Gastic ulcer       Allergies   Allergen Reactions   • Ciprofloxacin Mental Status Change       Current Outpatient Medications:   •  Biotin 5000 MCG tablet, , Disp: , Rfl:   •  calcium carb-cholecalciferol (Calcium 600+D) 600-800 MG-UNIT tablet, , Disp: , Rfl:   •  Coenzyme Q-10 100 MG capsule, Take 100 mg by mouth Daily., Disp: , Rfl:   •  desvenlafaxine (PRISTIQ) 50 MG 24 hr tablet, Take 50 mg by mouth Daily., Disp: , Rfl:   •  fluticasone (FLONASE)  50 MCG/ACT nasal spray, 2 sprays into the nostril(s) as directed by provider Daily., Disp: , Rfl:   •  folic acid (FOLVITE) 1 MG tablet, Take 1 mg by mouth Daily., Disp: , Rfl:   •  gabapentin (NEURONTIN) 600 MG tablet, Take 600 mg by mouth Daily., Disp: , Rfl:   •  levothyroxine (SYNTHROID, LEVOTHROID) 88 MCG tablet, Take 88 mcg by mouth Daily., Disp: , Rfl:   •  methotrexate 2.5 MG tablet, Take 2.5 mg by mouth 1 (One) Time Per Week. 8 tablets weekly, Disp: , Rfl:   •  Multiple Vitamins-Minerals (CENTRUM SILVER 50+WOMEN) tablet, , Disp: , Rfl:   •  omeprazole (priLOSEC) 40 MG capsule, Take 1 capsule by mouth 2 (Two) Times a Day Before Meals. Take a half hour before breakfast, Disp: 60 capsule, Rfl: 11  •  predniSONE (DELTASONE) 5 MG tablet, Take 5 mg by mouth 3 (Three) Times a Day., Disp: , Rfl:   •  sodium-potassium-magnesium sulfates (Suprep Bowel Prep Kit) 17.5-3.13-1.6 GM/177ML solution oral solution, Take 2 bottles by mouth Take As Directed. Do Not Eat The Day Before Your Procedure. Call [phone] for questions., Disp: 354 mL, Rfl: 0  •  triamterene-hydrochlorothiazide (MAXZIDE-25) 37.5-25 MG per tablet, Take 1 tablet by mouth Daily., Disp: , Rfl:   MEDICATION LIST AND ALLERGIES REVIEWED.    Social History     Tobacco Use   • Smoking status: Former Smoker     Packs/day: 2.00     Years: 15.00     Pack years: 30.00     Types: Cigarettes     Last attempt to quit: 1993     Years since quittin.1   • Smokeless tobacco: Never Used   Substance Use Topics   • Alcohol use: Yes     Drinks per session: 1 or 2     Binge frequency: Weekly   • Drug use: Never       FAMILY AND SOCIAL HISTORY REVIEWED.    Review of Systems   Constitutional: Negative for activity change, appetite change, fatigue, fever and unexpected weight change.   HENT: Negative for congestion, postnasal drip, rhinorrhea, sinus pressure, sore throat and voice change.    Eyes: Negative for visual disturbance.   Respiratory: Positive for shortness  "of breath. Negative for cough, chest tightness and wheezing.    Cardiovascular: Negative for chest pain, palpitations and leg swelling.   Gastrointestinal: Negative for abdominal distention, abdominal pain, nausea and vomiting.   Endocrine: Negative for cold intolerance and heat intolerance.   Genitourinary: Negative for difficulty urinating and urgency.   Musculoskeletal: Negative for arthralgias, back pain and neck pain.   Skin: Negative for color change and pallor.   Allergic/Immunologic: Negative for environmental allergies and food allergies.   Neurological: Negative for dizziness, syncope, weakness and light-headedness.   Hematological: Negative for adenopathy. Does not bruise/bleed easily.   Psychiatric/Behavioral: Negative for agitation and behavioral problems.   .    /76 (BP Location: Left arm, Patient Position: Sitting, Cuff Size: Adult)   Pulse 65   Temp 96.6 °F (35.9 °C)   Resp 16   Ht 167.6 cm (66\")   Wt 86.6 kg (191 lb)   SpO2 98% Comment: room air at rest  BMI 30.83 kg/m²     Immunization History   Administered Date(s) Administered   • Hepatitis A 12/04/2019       Physical Exam   Constitutional: She is oriented to person, place, and time. She appears well-developed and well-nourished.   HENT:   Head: Normocephalic and atraumatic.   Eyes: Pupils are equal, round, and reactive to light.   Neck: Normal range of motion. Neck supple. No thyromegaly present.   Cardiovascular: Normal rate, regular rhythm, normal heart sounds and intact distal pulses. Exam reveals no gallop and no friction rub.   No murmur heard.  Pulmonary/Chest: Effort normal and breath sounds normal. No respiratory distress. She has no wheezes. She has no rales. She exhibits no tenderness.   Abdominal: Soft. Bowel sounds are normal. There is no tenderness.   Musculoskeletal: Normal range of motion.   Lymphadenopathy:     She has no cervical adenopathy.   Neurological: She is alert and oriented to person, place, and time. "   Skin: Skin is warm and dry. Capillary refill takes less than 2 seconds. She is not diaphoretic.   Psychiatric: She has a normal mood and affect. Her behavior is normal.   Nursing note and vitals reviewed.        RESULTS    PFTS in the office today, read by me.  FVC 2.04 62% predicted, FEV1 1.61 64% predicted, FEV1/FVC 79% predicted, TLC 3.00 57% predicted, DLCO 58% predicted, patient had difficulty during testing, data may not be accurate.    PROBLEM LIST    Problem List Items Addressed This Visit        Respiratory    Dyspnea on exertion    Relevant Orders    CT Chest Without Contrast       Digestive    GERD       Musculoskeletal and Integument    Polymyositis (CMS/HCC)    Relevant Orders    CT Chest Without Contrast       Other    Ground glass opacities on Chest CT    Relevant Orders    CT Chest Without Contrast      Other Visit Diagnoses     Dyspnea, unspecified type    -  Primary    Relevant Orders    Pulmonary Function Test (Completed)            DISCUSSION    Ms. Raymond was here for follow-up of her dyspnea.  She seems to have slowly improved since her last appointment.  She will remain on prednisone and methotrexate per her rheumatologist for treatment of her polymyositis.  I did encourage her to do some light daily physical activity to help with her breathing.    We did review her PFTs in detail today and she has a mild obstruction, she had difficulty with during the testing and I am unsure if this is very accurate.    We did go over her lab work that was completed at the end of July with Dr. Drake.  Her liver enzymes are elevated and I did advise her to follow-up with her PCP or her rheumatologist.  She states that she has blood work to complete at her next rheumatology appointment.    I will order a repeat CT scan to follow-up in 3 months from her previous CT scan which will be due in October 2020.  She is agreeable to have the CT scan performed.    She will continue omeprazole daily for GERD.  We  also discussed reflux precautions in the office today.    She will follow-up in October/November with Dr. Drake or sooner if her symptoms worsen.  I did advise her to call with any additional concerns or questions.  I spent 25 minutes with the patient. I spent > 50% percent of this time counseling and discussing diagnosis, prognosis, diagnostic testing, evaluation, current status, treatment options and management.    Rebeka Washburn, DARIN  09/03/202011:10 AM  Electronically signed     Please note that portions of this note were completed with a voice recognition program. Efforts were made to edit the dictations, but occasionally words are mistranscribed.      CC: Devon Lantigua MD

## 2020-09-13 ENCOUNTER — APPOINTMENT (OUTPATIENT)
Dept: PREADMISSION TESTING | Facility: HOSPITAL | Age: 70
End: 2020-09-13

## 2020-09-13 LAB — SARS-COV-2 RNA NOSE QL NAA+PROBE: NOT DETECTED

## 2020-09-13 PROCEDURE — U0004 COV-19 TEST NON-CDC HGH THRU: HCPCS

## 2020-09-13 PROCEDURE — C9803 HOPD COVID-19 SPEC COLLECT: HCPCS

## 2020-09-15 ENCOUNTER — OUTSIDE FACILITY SERVICE (OUTPATIENT)
Dept: GASTROENTEROLOGY | Facility: CLINIC | Age: 70
End: 2020-09-15

## 2020-09-15 PROCEDURE — 43235 EGD DIAGNOSTIC BRUSH WASH: CPT | Performed by: INTERNAL MEDICINE

## 2020-09-15 PROCEDURE — 88305 TISSUE EXAM BY PATHOLOGIST: CPT | Performed by: INTERNAL MEDICINE

## 2020-09-16 ENCOUNTER — LAB REQUISITION (OUTPATIENT)
Dept: LAB | Facility: HOSPITAL | Age: 70
End: 2020-09-16

## 2020-09-16 DIAGNOSIS — K25.9 GASTRIC ULCER, UNSPECIFIED AS ACUTE OR CHRONIC, WITHOUT HEMORRHAGE OR PERFORATION: ICD-10-CM

## 2020-09-22 ENCOUNTER — HOSPITAL ENCOUNTER (OUTPATIENT)
Dept: CT IMAGING | Facility: HOSPITAL | Age: 70
Discharge: HOME | End: 2020-09-22
Admitting: NURSE PRACTITIONER

## 2020-09-22 DIAGNOSIS — R06.09 DYSPNEA ON EXERTION: ICD-10-CM

## 2020-09-22 DIAGNOSIS — R91.8 GROUND GLASS OPACITY PRESENT ON IMAGING OF LUNG: ICD-10-CM

## 2020-09-22 DIAGNOSIS — M33.20 POLYMYOSITIS (HCC): ICD-10-CM

## 2020-09-22 PROCEDURE — 71250 CT THORAX DX C-: CPT

## 2020-10-16 ENCOUNTER — OFFICE VISIT (OUTPATIENT)
Dept: PULMONOLOGY | Facility: CLINIC | Age: 70
End: 2020-10-16

## 2020-10-16 VITALS
OXYGEN SATURATION: 98 % | HEART RATE: 68 BPM | DIASTOLIC BLOOD PRESSURE: 82 MMHG | SYSTOLIC BLOOD PRESSURE: 122 MMHG | BODY MASS INDEX: 31.82 KG/M2 | WEIGHT: 198 LBS | HEIGHT: 66 IN | TEMPERATURE: 97.5 F

## 2020-10-16 DIAGNOSIS — K21.9 CHRONIC GERD: ICD-10-CM

## 2020-10-16 DIAGNOSIS — Z87.891 FORMER SMOKER: ICD-10-CM

## 2020-10-16 DIAGNOSIS — M33.20 POLYMYOSITIS (HCC): ICD-10-CM

## 2020-10-16 DIAGNOSIS — R06.09 DYSPNEA ON EXERTION: ICD-10-CM

## 2020-10-16 DIAGNOSIS — R91.8 GROUND GLASS OPACITY PRESENT ON IMAGING OF LUNG: Primary | ICD-10-CM

## 2020-10-16 PROCEDURE — 99214 OFFICE O/P EST MOD 30 MIN: CPT | Performed by: INTERNAL MEDICINE

## 2020-10-16 NOTE — PROGRESS NOTES
PULMONARY  NOTE    Chief Complaint     Polymyositis, abnormal CT scan of the chest, GERD, remote smoker, diastolic dysfunction    History of Present Illness     70-year-old white female returns today for follow-up.  She was last seen in the office by DARIN Saunders on 9/3/2020    She has a history of polymyositis.  Treated with steroids with the addition of methotrexate.  She continues to follow-up with Dr. Hopper.  She feels that she is doing better    She has shortness of breath and has undergone a CT scan of the chest earlier this year which revealed groundglass changes and prominent reticulation.  Repeat CT scan of the chest on 9/22/2020 was reviewed and as noted below    Has a history of tobacco abuse but none since 1993.  PFTs have not exhibited obstructive airways disease    She has a restrictive defect on PFTs, probably related to the interstitial process.    She has a history of reflux but is not having regular reflux symptoms.  She does take omeprazole on a regular basis, however    Patient Active Problem List   Diagnosis   • Polymyositis (CMS/HCC)   • Ground glass opacities on Chest CT   • GERD   • Remote smoker (None since 1993)   • Diastolic dysfunction   • Dyspnea on exertion   • Gastic ulcer     Allergies   Allergen Reactions   • Ciprofloxacin Mental Status Change       Current Outpatient Medications:   •  Biotin 5000 MCG tablet, , Disp: , Rfl:   •  calcium carb-cholecalciferol (Calcium 600+D) 600-800 MG-UNIT tablet, , Disp: , Rfl:   •  Coenzyme Q-10 100 MG capsule, Take 100 mg by mouth Daily., Disp: , Rfl:   •  desvenlafaxine (PRISTIQ) 50 MG 24 hr tablet, Take 50 mg by mouth Daily., Disp: , Rfl:   •  fluticasone (FLONASE) 50 MCG/ACT nasal spray, 2 sprays into the nostril(s) as directed by provider Daily., Disp: , Rfl:   •  folic acid (FOLVITE) 1 MG tablet, Take 1 mg by mouth Daily., Disp: , Rfl:   •  gabapentin (NEURONTIN) 600 MG tablet, Take 600 mg by mouth Daily., Disp: , Rfl:   •   levothyroxine (SYNTHROID, LEVOTHROID) 88 MCG tablet, Take 88 mcg by mouth Daily., Disp: , Rfl:   •  methotrexate 2.5 MG tablet, Take 2.5 mg by mouth 1 (One) Time Per Week. 8 tablets weekly, Disp: , Rfl:   •  Multiple Vitamins-Minerals (CENTRUM SILVER 50+WOMEN) tablet, , Disp: , Rfl:   •  omeprazole (priLOSEC) 40 MG capsule, Take 1 capsule by mouth 2 (Two) Times a Day Before Meals. Take a half hour before breakfast, Disp: 60 capsule, Rfl: 11  •  predniSONE (DELTASONE) 5 MG tablet, Take 10 mg by mouth 3 (Three) Times a Day., Disp: , Rfl:   •  sodium-potassium-magnesium sulfates (Suprep Bowel Prep Kit) 17.5-3.13-1.6 GM/177ML solution oral solution, Take 2 bottles by mouth Take As Directed. Do Not Eat The Day Before Your Procedure. Call [phone] for questions., Disp: 354 mL, Rfl: 0  •  triamterene-hydrochlorothiazide (MAXZIDE-25) 37.5-25 MG per tablet, Take 1 tablet by mouth Daily., Disp: , Rfl:   MEDICATION LIST AND ALLERGIES REVIEWED.    Family History   Problem Relation Age of Onset   • Alzheimer's disease Mother    • COPD Father    • Heart failure Father    • Diabetes Maternal Grandmother    • Heart failure Maternal Grandfather      Social History     Tobacco Use   • Smoking status: Former Smoker     Packs/day: 2.00     Years: 15.00     Pack years: 30.00     Types: Cigarettes     Quit date: 1993     Years since quittin.2   • Smokeless tobacco: Never Used   Substance Use Topics   • Alcohol use: Yes     Drinks per session: 1 or 2     Binge frequency: Weekly   • Drug use: Never     Social History     Social History Narrative    Single    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    Review of Systems  ALSO REFER TO SCANNED ROS SHEET FROM SAME DATE.    /82   Pulse 68    "Temp 97.5 °F (36.4 °C)   Ht 167.6 cm (66\")   Wt 89.8 kg (198 lb)   SpO2 98% Comment: resting at room air  BMI 31.96 kg/m²   Physical Exam  Vitals signs and nursing note reviewed.   Constitutional:       General: She is not in acute distress.     Appearance: She is well-developed. She is not diaphoretic.   HENT:      Head: Normocephalic and atraumatic.   Neck:      Thyroid: No thyromegaly.   Cardiovascular:      Rate and Rhythm: Normal rate and regular rhythm.      Heart sounds: Normal heart sounds. No murmur.   Pulmonary:      Effort: Pulmonary effort is normal.      Breath sounds: Normal breath sounds. No stridor.   Abdominal:      General: Bowel sounds are normal.      Palpations: Abdomen is soft.   Musculoskeletal: Normal range of motion.   Lymphadenopathy:      Cervical: No cervical adenopathy.      Upper Body:      Right upper body: No supraclavicular or epitrochlear adenopathy.      Left upper body: No supraclavicular or epitrochlear adenopathy.   Skin:     General: Skin is warm and dry.   Neurological:      Mental Status: She is alert and oriented to person, place, and time.   Psychiatric:         Behavior: Behavior normal.         Results     Most recent CT scan of the chest on 9/22/2020 reviewed on PACS and compared to prior outside film also loaded into the PACS system  Some interval improvement in groundglass changes and reticulation    Problem List       ICD-10-CM ICD-9-CM   1. Ground glass opacities on Chest CT  R91.8 793.19   2. Polymyositis (CMS/HCC)  M33.20 710.4   3. Remote smoker (None since 1993)  Z87.891 V15.82   4. GERD  K21.9 530.81   5. Dyspnea on exertion  R06.00 786.09       Discussion     Symptomatically she appears improved and she has had radiographic improvement as well.  She has groundglass disease it very well may represent autoimmune related interstitial lung disease.    As long as she has radiographic, symptomatic, and spirometric improvement on systemic therapy, will just " continue to follow her over time.  If she develops progressing fibrotic disease we might consider Ofev.    I have encouraged compliance with reflux precautions.    We will continue to follow-up on her restrictive defect with PFTs on return.  If his symptoms and PFTs improve, then will just get a CT scan of the chest next fall for follow-up    Given her autoimmune disease diagnosis, I think it would be reasonable to get a baseline transthoracic echocardiogram and monitor periodically for evidence of pulmonary hypertension    Edwardo Drake MD  Note electronically signed    CC: Devon Lantigua MD

## 2020-10-19 DIAGNOSIS — R91.8 GROUND GLASS OPACITY PRESENT ON IMAGING OF LUNG: ICD-10-CM

## 2020-10-19 DIAGNOSIS — R06.09 DYSPNEA ON EXERTION: Primary | ICD-10-CM

## 2020-11-25 ENCOUNTER — HOSPITAL ENCOUNTER (OUTPATIENT)
Dept: CARDIOLOGY | Facility: HOSPITAL | Age: 70
Discharge: HOME | End: 2020-11-25
Admitting: INTERNAL MEDICINE

## 2020-11-25 VITALS — BODY MASS INDEX: 32.14 KG/M2 | HEIGHT: 66 IN | WEIGHT: 200 LBS

## 2020-11-25 DIAGNOSIS — R91.8 GROUND GLASS OPACITY PRESENT ON IMAGING OF LUNG: ICD-10-CM

## 2020-11-25 DIAGNOSIS — R06.09 DYSPNEA ON EXERTION: ICD-10-CM

## 2020-11-25 LAB
BH CV ECHO MEAS - AO MAX PG (FULL): 2.7 MMHG
BH CV ECHO MEAS - AO MAX PG: 10 MMHG
BH CV ECHO MEAS - AO MEAN PG (FULL): 2 MMHG
BH CV ECHO MEAS - AO MEAN PG: 5 MMHG
BH CV ECHO MEAS - AO ROOT AREA (BSA CORRECTED): 1.4
BH CV ECHO MEAS - AO ROOT AREA: 5.7 CM^2
BH CV ECHO MEAS - AO ROOT DIAM: 2.7 CM
BH CV ECHO MEAS - AO V2 MAX: 156 CM/SEC
BH CV ECHO MEAS - AO V2 MEAN: 105 CM/SEC
BH CV ECHO MEAS - AO V2 VTI: 33.1 CM
BH CV ECHO MEAS - ASC AORTA: 2.6 CM
BH CV ECHO MEAS - AVA(I,A): 2.6 CM^2
BH CV ECHO MEAS - AVA(I,D): 2.6 CM^2
BH CV ECHO MEAS - AVA(V,A): 2.7 CM^2
BH CV ECHO MEAS - AVA(V,D): 2.7 CM^2
BH CV ECHO MEAS - BSA(HAYCOCK): 2.1 M^2
BH CV ECHO MEAS - BSA: 2 M^2
BH CV ECHO MEAS - BZI_BMI: 33.3 KILOGRAMS/M^2
BH CV ECHO MEAS - BZI_METRIC_HEIGHT: 165.1 CM
BH CV ECHO MEAS - BZI_METRIC_WEIGHT: 90.7 KG
BH CV ECHO MEAS - EDV(CUBED): 59.3 ML
BH CV ECHO MEAS - EDV(MOD-SP2): 67 ML
BH CV ECHO MEAS - EDV(MOD-SP4): 59 ML
BH CV ECHO MEAS - EDV(TEICH): 65.9 ML
BH CV ECHO MEAS - EF(CUBED): 72.4 %
BH CV ECHO MEAS - EF(MOD-BP): 65 %
BH CV ECHO MEAS - EF(MOD-SP2): 62.7 %
BH CV ECHO MEAS - EF(MOD-SP4): 66.1 %
BH CV ECHO MEAS - EF(TEICH): 64.8 %
BH CV ECHO MEAS - ESV(CUBED): 16.4 ML
BH CV ECHO MEAS - ESV(MOD-SP2): 25 ML
BH CV ECHO MEAS - ESV(MOD-SP4): 20 ML
BH CV ECHO MEAS - ESV(TEICH): 23.2 ML
BH CV ECHO MEAS - FS: 34.9 %
BH CV ECHO MEAS - IVS/LVPW: 1.1
BH CV ECHO MEAS - IVSD: 1.3 CM
BH CV ECHO MEAS - LA DIMENSION: 3.4 CM
BH CV ECHO MEAS - LA/AO: 1.3
BH CV ECHO MEAS - LAD MAJOR: 5.9 CM
BH CV ECHO MEAS - LAT PEAK E' VEL: 7.4 CM/SEC
BH CV ECHO MEAS - LATERAL E/E' RATIO: 9.9
BH CV ECHO MEAS - LV DIASTOLIC VOL/BSA (35-75): 29.8 ML/M^2
BH CV ECHO MEAS - LV IVRT: 0.1 SEC
BH CV ECHO MEAS - LV MASS(C)D: 164.3 GRAMS
BH CV ECHO MEAS - LV MASS(C)DI: 83 GRAMS/M^2
BH CV ECHO MEAS - LV MAX PG: 7.3 MMHG
BH CV ECHO MEAS - LV MEAN PG: 3 MMHG
BH CV ECHO MEAS - LV SYSTOLIC VOL/BSA (12-30): 10.1 ML/M^2
BH CV ECHO MEAS - LV V1 MAX: 135 CM/SEC
BH CV ECHO MEAS - LV V1 MEAN: 79.2 CM/SEC
BH CV ECHO MEAS - LV V1 VTI: 27.9 CM
BH CV ECHO MEAS - LVIDD: 3.9 CM
BH CV ECHO MEAS - LVIDS: 2.5 CM
BH CV ECHO MEAS - LVLD AP2: 6.7 CM
BH CV ECHO MEAS - LVLD AP4: 6.8 CM
BH CV ECHO MEAS - LVLS AP2: 5.6 CM
BH CV ECHO MEAS - LVLS AP4: 5.8 CM
BH CV ECHO MEAS - LVOT AREA (M): 3.1 CM^2
BH CV ECHO MEAS - LVOT AREA: 3.1 CM^2
BH CV ECHO MEAS - LVOT DIAM: 2 CM
BH CV ECHO MEAS - LVPWD: 1.2 CM
BH CV ECHO MEAS - MED PEAK E' VEL: 5.6 CM/SEC
BH CV ECHO MEAS - MEDIAL E/E' RATIO: 13.1
BH CV ECHO MEAS - MV A MAX VEL: 65.6 CM/SEC
BH CV ECHO MEAS - MV DEC SLOPE: 459 CM/SEC^2
BH CV ECHO MEAS - MV DEC TIME: 0.14 SEC
BH CV ECHO MEAS - MV E MAX VEL: 73.1 CM/SEC
BH CV ECHO MEAS - MV E/A: 1.1
BH CV ECHO MEAS - MV P1/2T MAX VEL: 113 CM/SEC
BH CV ECHO MEAS - MV P1/2T: 72.1 MSEC
BH CV ECHO MEAS - MVA P1/2T LCG: 1.9 CM^2
BH CV ECHO MEAS - MVA(P1/2T): 3.1 CM^2
BH CV ECHO MEAS - PA ACC SLOPE: 451.5 CM/SEC^2
BH CV ECHO MEAS - PA ACC TIME: 0.15 SEC
BH CV ECHO MEAS - PA PR(ACCEL): 12.4 MMHG
BH CV ECHO MEAS - SI(AO): 95.8 ML/M^2
BH CV ECHO MEAS - SI(CUBED): 21.7 ML/M^2
BH CV ECHO MEAS - SI(LVOT): 44.3 ML/M^2
BH CV ECHO MEAS - SI(MOD-SP2): 21.2 ML/M^2
BH CV ECHO MEAS - SI(MOD-SP4): 19.7 ML/M^2
BH CV ECHO MEAS - SI(TEICH): 21.6 ML/M^2
BH CV ECHO MEAS - SV(AO): 189.5 ML
BH CV ECHO MEAS - SV(CUBED): 42.9 ML
BH CV ECHO MEAS - SV(LVOT): 87.7 ML
BH CV ECHO MEAS - SV(MOD-SP2): 42 ML
BH CV ECHO MEAS - SV(MOD-SP4): 39 ML
BH CV ECHO MEAS - SV(TEICH): 42.7 ML
BH CV ECHO MEAS - TAPSE (>1.6): 2.3 CM
BH CV ECHO MEAS - TR MAX PG: 21 MMHG
BH CV ECHO MEAS - TR MAX VEL: 228.5 CM/SEC
BH CV ECHO MEASUREMENTS AVERAGE E/E' RATIO: 11.25
BH CV VAS BP RIGHT ARM: NORMAL MMHG
BH CV XLRA - RV BASE: 2.7 CM
BH CV XLRA - RV MID: 2.4 CM
BH CV XLRA - TDI S': 13.7 CM/SEC
LEFT ATRIUM VOLUME INDEX: 32.4 ML/M^2
LEFT ATRIUM VOLUME: 64 ML
MAXIMAL PREDICTED HEART RATE: 151 BPM
STRESS TARGET HR: 128 BPM

## 2020-11-25 PROCEDURE — 93306 TTE W/DOPPLER COMPLETE: CPT | Performed by: INTERNAL MEDICINE

## 2020-11-25 PROCEDURE — 93306 TTE W/DOPPLER COMPLETE: CPT

## 2021-02-09 ENCOUNTER — CLINICAL SUPPORT (OUTPATIENT)
Dept: PULMONOLOGY | Facility: CLINIC | Age: 71
End: 2021-02-09

## 2021-02-09 DIAGNOSIS — Z01.812 BLOOD TESTS PRIOR TO TREATMENT OR PROCEDURE: Primary | ICD-10-CM

## 2021-02-09 PROCEDURE — 99211 OFF/OP EST MAY X REQ PHY/QHP: CPT | Performed by: INTERNAL MEDICINE

## 2021-02-09 PROCEDURE — U0004 COV-19 TEST NON-CDC HGH THRU: HCPCS | Performed by: INTERNAL MEDICINE

## 2021-02-10 LAB — SARS-COV-2 RNA RESP QL NAA+PROBE: NOT DETECTED

## 2021-02-11 ENCOUNTER — OFFICE VISIT (OUTPATIENT)
Dept: PULMONOLOGY | Facility: CLINIC | Age: 71
End: 2021-02-11

## 2021-02-11 VITALS
RESPIRATION RATE: 18 BRPM | TEMPERATURE: 97.5 F | HEART RATE: 81 BPM | HEIGHT: 66 IN | WEIGHT: 201 LBS | BODY MASS INDEX: 32.3 KG/M2 | OXYGEN SATURATION: 98 % | DIASTOLIC BLOOD PRESSURE: 70 MMHG | SYSTOLIC BLOOD PRESSURE: 128 MMHG

## 2021-02-11 DIAGNOSIS — R06.09 DYSPNEA ON EXERTION: ICD-10-CM

## 2021-02-11 DIAGNOSIS — R91.8 GROUND GLASS OPACITY PRESENT ON IMAGING OF LUNG: Primary | ICD-10-CM

## 2021-02-11 DIAGNOSIS — K21.9 CHRONIC GERD: ICD-10-CM

## 2021-02-11 DIAGNOSIS — M33.20 POLYMYOSITIS (HCC): ICD-10-CM

## 2021-02-11 DIAGNOSIS — Z87.891 FORMER SMOKER: ICD-10-CM

## 2021-02-11 DIAGNOSIS — R06.09 DYSPNEA ON EXERTION: Primary | ICD-10-CM

## 2021-02-11 DIAGNOSIS — I51.89 DIASTOLIC DYSFUNCTION: ICD-10-CM

## 2021-02-11 PROCEDURE — 99214 OFFICE O/P EST MOD 30 MIN: CPT | Performed by: INTERNAL MEDICINE

## 2021-02-11 NOTE — PROGRESS NOTES
PULMONARY  NOTE    Chief Complaint     Polymyositis, abnormal chest CT, GERD, remote smoker, diastolic dysfunction    History of Present Illness     71-year-old female returns today for follow-up.  I last saw her on 10/16/2020    She has a history of polymyositis.  Followed by Dr. Hopper.  She remains on low-dose prednisone 5 mg a day and methotrexate    She had groundglass changes with prominent reticulations on a prior CT scan of the chest.  Follow-up CT scan of the chest on 9/22/2020 revealed interval improvement    She also had dyspnea on exertion which she felt was improved when I last saw her.    She has a history of tobacco abuse, resolved since 1993.  No evidence of airway obstruction on prior PFTs    She did have a restrictive defect on PFTs, possibly related to her interstitial process.    A history of reflux for which she takes prescription strength omeprazole twice a day.    Echocardiogram done on her prior visit revealed no evidence of pulmonary hypertension    She still has dyspnea on exertion.  She does not really feel it is any worse.  She does admit to being relatively sedentary and gaining weight with the Covid isolation  She is not having any regular cough or sputum production.  No lower extremity edema, chest pain, or palpitations    Patient Active Problem List   Diagnosis   • Polymyositis (CMS/HCC)   • Ground glass opacities on Chest CT   • GERD   • Remote smoker (None since 1993)   • Diastolic dysfunction   • Dyspnea on exertion   • Gastic ulcer     Allergies   Allergen Reactions   • Ciprofloxacin Mental Status Change       Current Outpatient Medications:   •  Biotin 5000 MCG tablet, , Disp: , Rfl:   •  calcium carb-cholecalciferol (Calcium 600+D) 600-800 MG-UNIT tablet, , Disp: , Rfl:   •  Coenzyme Q-10 100 MG capsule, Take 100 mg by mouth Daily., Disp: , Rfl:   •  fluticasone (FLONASE) 50 MCG/ACT nasal spray, 2 sprays into the nostril(s) as directed by provider Daily., Disp: , Rfl:   •   folic acid (FOLVITE) 1 MG tablet, Take 1 mg by mouth Daily., Disp: , Rfl:   •  gabapentin (NEURONTIN) 600 MG tablet, Take 600 mg by mouth Daily., Disp: , Rfl:   •  levothyroxine (SYNTHROID, LEVOTHROID) 88 MCG tablet, Take 88 mcg by mouth Daily., Disp: , Rfl:   •  methotrexate 2.5 MG tablet, Take 2.5 mg by mouth 1 (One) Time Per Week. 8 tablets weekly, Disp: , Rfl:   •  Multiple Vitamins-Minerals (CENTRUM SILVER 50+WOMEN) tablet, , Disp: , Rfl:   •  omeprazole (priLOSEC) 40 MG capsule, Take 1 capsule by mouth 2 (Two) Times a Day Before Meals. Take a half hour before breakfast, Disp: 60 capsule, Rfl: 11  •  predniSONE (DELTASONE) 5 MG tablet, Take 10 mg by mouth 3 (Three) Times a Day., Disp: , Rfl:   •  sodium-potassium-magnesium sulfates (Suprep Bowel Prep Kit) 17.5-3.13-1.6 GM/177ML solution oral solution, Take 2 bottles by mouth Take As Directed. Do Not Eat The Day Before Your Procedure. Call [phone] for questions., Disp: 354 mL, Rfl: 0  •  triamterene-hydrochlorothiazide (MAXZIDE-25) 37.5-25 MG per tablet, Take 1 tablet by mouth Daily., Disp: , Rfl:   •  desvenlafaxine (PRISTIQ) 50 MG 24 hr tablet, Take 50 mg by mouth Daily., Disp: , Rfl:   MEDICATION LIST AND ALLERGIES REVIEWED.    Family History   Problem Relation Age of Onset   • Alzheimer's disease Mother    • COPD Father    • Heart failure Father    • Diabetes Maternal Grandmother    • Heart failure Maternal Grandfather      Social History     Tobacco Use   • Smoking status: Former Smoker     Packs/day: 2.00     Years: 15.00     Pack years: 30.00     Types: Cigarettes     Quit date: 1993     Years since quittin.5   • Smokeless tobacco: Never Used   Substance Use Topics   • Alcohol use: Yes     Drinks per session: 1 or 2     Binge frequency: Weekly   • Drug use: Never     Social History     Social History Narrative    Single    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    Review of Systems  ALSO REFER TO SCANNED ROS SHEET FROM SAME DATE.    /70 (BP Location: Left arm, Patient Position: Sitting, Cuff Size: Adult)   Pulse 81   Temp 97.5 °F (36.4 °C)   Resp 18   Ht 167 cm (65.75\")   Wt 91.2 kg (201 lb)   SpO2 98% Comment: room air at rest  BMI 32.69 kg/m²   Physical Exam  Vitals signs and nursing note reviewed.   Constitutional:       General: She is not in acute distress.     Appearance: She is well-developed. She is not diaphoretic.   HENT:      Head: Normocephalic and atraumatic.   Neck:      Thyroid: No thyromegaly.   Cardiovascular:      Rate and Rhythm: Normal rate and regular rhythm.      Heart sounds: Normal heart sounds. No murmur.   Pulmonary:      Effort: Pulmonary effort is normal.      Breath sounds: Normal breath sounds. No stridor.   Abdominal:      General: Bowel sounds are normal.      Palpations: Abdomen is soft.   Musculoskeletal: Normal range of motion.      Right lower leg: No edema.      Left lower leg: No edema.   Lymphadenopathy:      Cervical: No cervical adenopathy.      Upper Body:      Right upper body: No supraclavicular or epitrochlear adenopathy.      Left upper body: No supraclavicular or epitrochlear adenopathy.   Skin:     General: Skin is warm and dry.   Neurological:      Mental Status: She is alert and oriented to person, place, and time.   Psychiatric:         Behavior: Behavior normal.         Results     CT scan of the chest from 9/22/2020 again revealed on PACS.  Stable/improved groundglass changes and reticulation    Problem List       ICD-10-CM ICD-9-CM   1. Ground glass opacities on Chest CT  R91.8 793.19   2. Polymyositis (CMS/HCC)  M33.20 710.4   3. Remote smoker (None since 1993)  Z87.891 V15.82   4. GERD  K21.9 530.81   5. Dyspnea on exertion  R06.00 786.09   6. " Diastolic dysfunction  I51.89 429.9       Discussion     Overall I think her assessment is that her dyspnea is about the same.  Unfortunately, she has not been getting the regular exercise that she was getting in the past and her weight has increased.    We are hoping to get PFTs today but due to weather issues we could not do that.  At the very least, plan to see her back in the summer with repeat PFTs.  I would also suggest a follow-up CT scan of the chest in the fall, as well.    Most recent transthoracic echocardiogram revealed no evidence of pulmonary hypertension.  Would suggest following up on that periodically, such as in 2022    I recommended efforts at regular exercise and weight loss    She is going to remain on prescription strength omeprazole for her reflux.  I reinforced reflux precautions    I will plan to see her back in the summer    Moderate level of Medical Decision Making complexity based on 2 or more chronic stable illnesses and prescription drug management.    Edwardo Drake MD  Note electronically signed    CC: Devon Lantigua MD

## 2021-02-12 DIAGNOSIS — R91.8 GROUND GLASS OPACITY PRESENT ON IMAGING OF LUNG: Primary | ICD-10-CM

## 2021-08-05 ENCOUNTER — HOSPITAL ENCOUNTER (OUTPATIENT)
Dept: CT IMAGING | Facility: HOSPITAL | Age: 71
Discharge: HOME | End: 2021-08-05
Admitting: INTERNAL MEDICINE

## 2021-08-05 DIAGNOSIS — R91.8 GROUND GLASS OPACITY PRESENT ON IMAGING OF LUNG: ICD-10-CM

## 2021-08-05 PROCEDURE — 71250 CT THORAX DX C-: CPT

## 2021-08-10 DIAGNOSIS — Z01.812 BLOOD TESTS PRIOR TO TREATMENT OR PROCEDURE: Primary | ICD-10-CM

## 2021-08-11 ENCOUNTER — CLINICAL SUPPORT NO REQUIREMENTS (OUTPATIENT)
Dept: PULMONOLOGY | Facility: CLINIC | Age: 71
End: 2021-08-11

## 2021-08-11 DIAGNOSIS — Z01.812 BLOOD TESTS PRIOR TO TREATMENT OR PROCEDURE: ICD-10-CM

## 2021-08-11 PROCEDURE — 99211 OFF/OP EST MAY X REQ PHY/QHP: CPT | Performed by: INTERNAL MEDICINE

## 2021-08-11 PROCEDURE — U0004 COV-19 TEST NON-CDC HGH THRU: HCPCS | Performed by: INTERNAL MEDICINE

## 2021-08-12 LAB — SARS-COV-2 RNA NOSE QL NAA+PROBE: NOT DETECTED

## 2021-08-13 ENCOUNTER — OFFICE VISIT (OUTPATIENT)
Dept: PULMONOLOGY | Facility: CLINIC | Age: 71
End: 2021-08-13

## 2021-08-13 VITALS
WEIGHT: 159 LBS | OXYGEN SATURATION: 96 % | TEMPERATURE: 97.8 F | DIASTOLIC BLOOD PRESSURE: 70 MMHG | BODY MASS INDEX: 25.55 KG/M2 | HEIGHT: 66 IN | SYSTOLIC BLOOD PRESSURE: 112 MMHG | HEART RATE: 85 BPM | RESPIRATION RATE: 18 BRPM

## 2021-08-13 DIAGNOSIS — I51.89 DIASTOLIC DYSFUNCTION: ICD-10-CM

## 2021-08-13 DIAGNOSIS — Z87.891 FORMER SMOKER: ICD-10-CM

## 2021-08-13 DIAGNOSIS — R91.8 GROUND GLASS OPACITY PRESENT ON IMAGING OF LUNG: ICD-10-CM

## 2021-08-13 DIAGNOSIS — M33.20 POLYMYOSITIS (HCC): ICD-10-CM

## 2021-08-13 DIAGNOSIS — R06.09 DYSPNEA ON EXERTION: Primary | ICD-10-CM

## 2021-08-13 PROCEDURE — 94375 RESPIRATORY FLOW VOLUME LOOP: CPT | Performed by: INTERNAL MEDICINE

## 2021-08-13 PROCEDURE — 94729 DIFFUSING CAPACITY: CPT | Performed by: INTERNAL MEDICINE

## 2021-08-13 PROCEDURE — 99215 OFFICE O/P EST HI 40 MIN: CPT | Performed by: INTERNAL MEDICINE

## 2021-08-13 PROCEDURE — 94726 PLETHYSMOGRAPHY LUNG VOLUMES: CPT | Performed by: INTERNAL MEDICINE

## 2021-08-13 RX ORDER — VENLAFAXINE 50 MG/1
50 TABLET ORAL DAILY
COMMUNITY
End: 2022-03-08

## 2021-08-13 RX ORDER — POTASSIUM CHLORIDE 750 MG/1
10 CAPSULE, EXTENDED RELEASE ORAL 2 TIMES DAILY
COMMUNITY
Start: 2021-07-28

## 2021-08-13 RX ORDER — PREDNISONE 2.5 MG
2.5 TABLET ORAL DAILY
COMMUNITY
Start: 2021-07-13 | End: 2022-12-20

## 2021-08-13 NOTE — PROGRESS NOTES
"  PULMONARY  NOTE    Chief Complaint     Polymyositis, groundglass infiltrates, GERD, remote smoker, diastolic dysfunction    History of Present Illness     71-year-old female returns today for follow-up  I last saw her on 2/11/2021    She has a history of polymyositis, followed by Dr. Hopper.  She remains on low-dose prednisone, methotrexate, and has received Rituxan twice this year, last in April    She has had groundglass changes on CT scan of the chest with most recent CT scan of the chest in September revealing interval improvement    She has had a restrictive defect on prior PFTs.    She comes in today indicating that she is doing \"okay\".  No exacerbation of symptoms such as cough or shortness of breath.  No fevers or chills    She has a history of tobacco abuse, resolved in 1993  She has not exhibited airway obstruction on prior PFTs.    Patient Active Problem List   Diagnosis   • Polymyositis (CMS/HCC)   • Ground glass opacities on Chest CT   • GERD   • Remote smoker (None since 1993)   • Diastolic dysfunction   • Dyspnea on exertion   • Gastic ulcer     Allergies   Allergen Reactions   • Ciprofloxacin Mental Status Change       Current Outpatient Medications:   •  Biotin 5000 MCG tablet, Take 1 tablet by mouth Daily., Disp: , Rfl:   •  calcium carb-cholecalciferol (Calcium 600+D) 600-800 MG-UNIT tablet, , Disp: , Rfl:   •  Coenzyme Q-10 100 MG capsule, Take 100 mg by mouth Daily., Disp: , Rfl:   •  desvenlafaxine (PRISTIQ) 50 MG 24 hr tablet, Take 50 mg by mouth Daily., Disp: , Rfl:   •  fluticasone (FLONASE) 50 MCG/ACT nasal spray, 2 sprays into the nostril(s) as directed by provider Daily., Disp: , Rfl:   •  folic acid (FOLVITE) 1 MG tablet, Take 1 mg by mouth Daily., Disp: , Rfl:   •  gabapentin (NEURONTIN) 600 MG tablet, Take 600 mg by mouth Daily., Disp: , Rfl:   •  levothyroxine (SYNTHROID, LEVOTHROID) 88 MCG tablet, Take 88 mcg by mouth Daily., Disp: , Rfl:   •  methotrexate 2.5 MG tablet, Take 2.5 " mg by mouth 1 (One) Time Per Week. 8 tablets weekly, Disp: , Rfl:   •  Multiple Vitamins-Minerals (CENTRUM SILVER 50+WOMEN) tablet, , Disp: , Rfl:   •  omeprazole (priLOSEC) 40 MG capsule, Take 1 capsule by mouth 2 (Two) Times a Day Before Meals. Take a half hour before breakfast, Disp: 60 capsule, Rfl: 11  •  potassium chloride (MICRO-K) 10 MEQ CR capsule, Take 10 mEq by mouth 2 (Two) Times a Day., Disp: , Rfl:   •  predniSONE (DELTASONE) 2.5 MG tablet, Take 2.5 mg by mouth Daily., Disp: , Rfl:   •  predniSONE (DELTASONE) 5 MG tablet, Take 10 mg by mouth 3 (Three) Times a Day., Disp: , Rfl:   •  sodium-potassium-magnesium sulfates (Suprep Bowel Prep Kit) 17.5-3.13-1.6 GM/177ML solution oral solution, Take 2 bottles by mouth Take As Directed. Do Not Eat The Day Before Your Procedure. Call [phone] for questions., Disp: 354 mL, Rfl: 0  •  triamterene-hydrochlorothiazide (MAXZIDE-25) 37.5-25 MG per tablet, Take 1 tablet by mouth Daily., Disp: , Rfl:   •  venlafaxine (EFFEXOR) 50 MG tablet, Take 50 mg by mouth Daily., Disp: , Rfl:   MEDICATION LIST AND ALLERGIES REVIEWED.    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    Review of  "Systems  ALSO REFER TO SCANNED ROS SHEET FROM SAME DATE.    /70 (BP Location: Left arm, Patient Position: Sitting, Cuff Size: Adult)   Pulse 85   Temp 97.8 °F (36.6 °C)   Resp 18   Ht 167 cm (65.75\")   Wt 72.1 kg (159 lb)   SpO2 96% Comment: room air at rest  BMI 25.86 kg/m²   Physical Exam  Vitals and nursing note reviewed.   Constitutional:       General: She is not in acute distress.     Appearance: She is well-developed. She is not diaphoretic.   HENT:      Head: Normocephalic and atraumatic.   Neck:      Thyroid: No thyromegaly.   Cardiovascular:      Rate and Rhythm: Normal rate and regular rhythm.      Heart sounds: No murmur heard.     Pulmonary:      Effort: Pulmonary effort is normal.      Breath sounds: Normal breath sounds. No stridor.   Abdominal:      General: Bowel sounds are normal.      Palpations: Abdomen is soft.   Musculoskeletal:         General: Normal range of motion.   Lymphadenopathy:      Cervical: No cervical adenopathy.      Upper Body:      Right upper body: No supraclavicular or epitrochlear adenopathy.      Left upper body: No supraclavicular or epitrochlear adenopathy.   Skin:     General: Skin is warm and dry.   Neurological:      Mental Status: She is alert and oriented to person, place, and time.   Psychiatric:         Behavior: Behavior normal.         Results     CT scan of chest from 8/5/2021 reveals some interval increase in groundglass infiltrates.  No mass or adenopathy    PFTs reveal no airway obstruction although a poor expiratory effort.  No restriction in a reduced diffusion capacity  TLC is significantly improved in comparison on 9/2020    Immunization History   Administered Date(s) Administered   • Hepatitis A 12/04/2019     Problem List       ICD-10-CM ICD-9-CM   1. Dyspnea on exertion  R06.00 786.09   2. Ground glass opacities on Chest CT  R91.8 793.19   3. Polymyositis (CMS/HCC)  M33.20 710.4   4. Remote smoker (None since 1993)  Z87.891 V15.82   5. " "Diastolic dysfunction  I51.89 429.9       Discussion     We reviewed her test results  We have some conflicting data  Symptomatically she is stable and TLC is significantly improved on PFTs  However, there does appear to be some interval increase in groundglass changes on CT scan of the chest    She is already on low-dose prednisone, methotrexate, and has received Rituxan.  I will communicate with Dr. Hopper.  Change in immunosuppressant therapy might be considered versus continued watchful waiting.    We did discuss Ofev as an antifibrotic agent indicated for autoimmune related ILD.  At this point, I am not sure that she meets criteria as we have not documented \"progression\" with pulmonary function test.  We did discuss the medication as well as potential side effects.    At the very least, I will see her back later on in the fall and we will probably reimage her chest before the end of the year    Level of service justified based on 45 minutes spent in patient care on this date of service including, but not limited to: preparing to see the patient, obtaining and/or reviewing history, performing medically appropriate examination, ordering tests/medicine/procedures, independently interpreting results, documenting clinical information in EHR, and counseling/education of patient/family/caregiver (excluding time spent on other separate services such as performing procedures or test interpretation, if applicable). (Level 4 30-39 minutes; Level 5 40-54 minutes)    Edwardo Drake MD  Note electronically signed    CC: Devon Lantigua MD  "

## 2022-03-08 ENCOUNTER — CLINICAL SUPPORT NO REQUIREMENTS (OUTPATIENT)
Dept: PULMONOLOGY | Facility: CLINIC | Age: 72
End: 2022-03-08

## 2022-03-08 ENCOUNTER — OFFICE VISIT (OUTPATIENT)
Dept: PULMONOLOGY | Facility: CLINIC | Age: 72
End: 2022-03-08

## 2022-03-08 VITALS
HEIGHT: 66 IN | OXYGEN SATURATION: 94 % | WEIGHT: 161 LBS | SYSTOLIC BLOOD PRESSURE: 136 MMHG | RESPIRATION RATE: 14 BRPM | TEMPERATURE: 97.5 F | DIASTOLIC BLOOD PRESSURE: 70 MMHG | BODY MASS INDEX: 25.88 KG/M2 | HEART RATE: 67 BPM

## 2022-03-08 DIAGNOSIS — Z01.812 BLOOD TESTS PRIOR TO TREATMENT OR PROCEDURE: Primary | ICD-10-CM

## 2022-03-08 DIAGNOSIS — R91.8 GROUND GLASS OPACITY PRESENT ON IMAGING OF LUNG: ICD-10-CM

## 2022-03-08 DIAGNOSIS — Z87.891 FORMER SMOKER: ICD-10-CM

## 2022-03-08 DIAGNOSIS — M33.20 POLYMYOSITIS: ICD-10-CM

## 2022-03-08 DIAGNOSIS — I51.89 DIASTOLIC DYSFUNCTION: ICD-10-CM

## 2022-03-08 DIAGNOSIS — K21.9 CHRONIC GERD: ICD-10-CM

## 2022-03-08 DIAGNOSIS — R06.09 DYSPNEA ON EXERTION: ICD-10-CM

## 2022-03-08 PROCEDURE — 99211 OFF/OP EST MAY X REQ PHY/QHP: CPT | Performed by: INTERNAL MEDICINE

## 2022-03-08 PROCEDURE — U0004 COV-19 TEST NON-CDC HGH THRU: HCPCS | Performed by: INTERNAL MEDICINE

## 2022-03-08 PROCEDURE — 99214 OFFICE O/P EST MOD 30 MIN: CPT | Performed by: INTERNAL MEDICINE

## 2022-03-08 RX ORDER — BEMPEDOIC ACID AND EZETIMIBE 180; 10 MG/1; MG/1
1 TABLET, FILM COATED ORAL DAILY
COMMUNITY

## 2022-03-08 RX ORDER — RITUXIMAB 10 MG/ML
INJECTION, SOLUTION INTRAVENOUS
COMMUNITY
Start: 2022-01-10

## 2022-03-08 RX ORDER — MYCOPHENOLATE MOFETIL 500 MG/1
1500 TABLET ORAL 2 TIMES DAILY
COMMUNITY
Start: 2022-03-07

## 2022-03-08 NOTE — PROGRESS NOTES
PULMONARY  NOTE    Chief Complaint     Polymyositis, groundglass infiltrates, GERD, remote smoker, diastolic dysfunction    History of Present Illness     72-year-old female returns today for follow-up  Last saw her 8/13/2021    He has a history of polymyositis, followed by Dr. Hopper  She has been on prednisone, methotrexate, and Rituxan in the past  Based on some radiographic progression of groundglass infiltrates in the fall her medical regimen was modified  She negates that initially after her medications were changed she became more symptomatically controlled with increased medication dosing    Since I have seen her she feels more fatigued  She has noted some shortness of breath  She has a morning cough which is nonproductive  She has been able to avoid COVID-19 infection to her knowledge    Previous PFTs, done in August, revealed improved TLC and no reduction in diffusion capacity    Patient Active Problem List   Diagnosis   • Polymyositis (HCC)   • Ground glass opacities on Chest CT   • GERD   • Remote smoker (None since 1993)   • Diastolic dysfunction   • Dyspnea on exertion   • Gastic ulcer     Allergies   Allergen Reactions   • Ciprofloxacin Mental Status Change and Anxiety       Current Outpatient Medications:   •  Bempedoic Acid-Ezetimibe (Nexlizet) 180-10 MG tablet, Take 1 tablet by mouth Daily., Disp: , Rfl:   •  Biotin 5000 MCG tablet, Take 1 tablet by mouth Daily., Disp: , Rfl:   •  calcium carb-cholecalciferol 600-800 MG-UNIT tablet, , Disp: , Rfl:   •  Coenzyme Q-10 100 MG capsule, Take 100 mg by mouth Daily., Disp: , Rfl:   •  desvenlafaxine (PRISTIQ) 50 MG 24 hr tablet, Take 50 mg by mouth Daily., Disp: , Rfl:   •  fluticasone (FLONASE) 50 MCG/ACT nasal spray, 2 sprays into the nostril(s) as directed by provider Daily., Disp: , Rfl:   •  gabapentin (NEURONTIN) 600 MG tablet, Take 600 mg by mouth Daily., Disp: , Rfl:   •  levothyroxine (SYNTHROID, LEVOTHROID) 88 MCG tablet, Take 88 mcg by  "mouth Daily., Disp: , Rfl:   •  Multiple Vitamins-Minerals (CENTRUM SILVER 50+WOMEN) tablet, , Disp: , Rfl:   •  mycophenolate (CELLCEPT) 500 MG tablet, , Disp: , Rfl:   •  omeprazole (priLOSEC) 40 MG capsule, Take 1 capsule by mouth 2 (Two) Times a Day Before Meals. Take a half hour before breakfast, Disp: 60 capsule, Rfl: 11  •  potassium chloride (MICRO-K) 10 MEQ CR capsule, Take 10 mEq by mouth 2 (Two) Times a Day., Disp: , Rfl:   •  predniSONE (DELTASONE) 2.5 MG tablet, Take 2.5 mg by mouth Daily., Disp: , Rfl:   •  predniSONE (DELTASONE) 5 MG tablet, Take 10 mg by mouth 3 (Three) Times a Day., Disp: , Rfl:   •  riTUXimab (Rituxan) 100 MG/10ML solution injection, Administer RITUXAN 1000mg IV week 0, 2, then every 6 months, Disp: , Rfl:   •  triamterene-hydrochlorothiazide (MAXZIDE-25) 37.5-25 MG per tablet, Take 1 tablet by mouth Daily., Disp: , Rfl:   MEDICATION LIST AND ALLERGIES REVIEWED.    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    Review of Systems  ALSO REFER TO SCANNED ROS SHEET FROM SAME DATE.    /70   Pulse 67   Temp 97.5 °F (36.4 °C)   Resp 14   Ht 167 cm (65.75\")   Wt 73 kg (161 lb)   SpO2 94% Comment: " Room air  BMI 26.18 kg/m²   Physical Exam  Vitals and nursing note reviewed.   Constitutional:       General: She is not in acute distress.     Appearance: She is well-developed. She is not diaphoretic.   HENT:      Head: Normocephalic and atraumatic.   Neck:      Thyroid: No thyromegaly.   Cardiovascular:      Rate and Rhythm: Normal rate and regular rhythm.      Heart sounds: Normal heart sounds. No murmur heard.  Pulmonary:      Effort: Pulmonary effort is normal.      Breath sounds: Normal breath sounds. No stridor.   Chest:   Breasts:      Right: No supraclavicular adenopathy.      Left: No supraclavicular adenopathy.       Lymphadenopathy:      Cervical: No cervical adenopathy.      Upper Body:      Right upper body: No supraclavicular or epitrochlear adenopathy.      Left upper body: No supraclavicular or epitrochlear adenopathy.   Skin:     General: Skin is warm and dry.   Neurological:      Mental Status: She is alert and oriented to person, place, and time.   Psychiatric:         Behavior: Behavior normal.         Results     Immunization History   Administered Date(s) Administered   • COVID-19 (PFIZER) PURPLE CAP 02/03/2021, 03/02/2021, 09/14/2021   • Hepatitis A 12/04/2019     Problem List       ICD-10-CM ICD-9-CM   1. Blood tests prior to treatment or procedure  Z01.812 V72.63   2. Ground glass opacities on Chest CT  R91.8 793.19   3. Polymyositis (HCC)  M33.20 710.4   4. GERD  K21.9 530.81   5. Remote smoker (None since 1993)  Z87.891 V15.82   6. Diastolic dysfunction  I51.89 429.9       Discussion     Symptomatically she is not really any better in fact is more fatigued and does not feel some degree of shortness of breath  She typically has a morning nonproductive cough    I am going to repeat PFTs  This will help us get a quantitative assessment of lung function  We will also need to get a follow-up CT scan of the chest in the future, as well but I like to get the PFTs, first.    I recommended a  humidifier for her home until the weather and humidity improve    I will plan to see her back after her PFTs    Moderate level of Medical Decision Making complexity based on 2 or more chronic stable illnesses and prescription drug management.    Edwardo Drake MD  Note electronically signed    CC: Devon Lantigua MD

## 2022-03-09 LAB — SARS-COV-2 RNA NOSE QL NAA+PROBE: NOT DETECTED

## 2022-03-10 ENCOUNTER — OFFICE VISIT (OUTPATIENT)
Dept: PULMONOLOGY | Facility: CLINIC | Age: 72
End: 2022-03-10

## 2022-03-10 VITALS
HEIGHT: 66 IN | OXYGEN SATURATION: 99 % | DIASTOLIC BLOOD PRESSURE: 70 MMHG | SYSTOLIC BLOOD PRESSURE: 116 MMHG | HEART RATE: 68 BPM | WEIGHT: 162.13 LBS | BODY MASS INDEX: 26.06 KG/M2 | RESPIRATION RATE: 16 BRPM | TEMPERATURE: 98.6 F

## 2022-03-10 DIAGNOSIS — M33.20 POLYMYOSITIS: Primary | ICD-10-CM

## 2022-03-10 DIAGNOSIS — Z87.891 FORMER SMOKER: ICD-10-CM

## 2022-03-10 DIAGNOSIS — I51.89 DIASTOLIC DYSFUNCTION: ICD-10-CM

## 2022-03-10 DIAGNOSIS — R91.8 GROUND GLASS OPACITY PRESENT ON IMAGING OF LUNG: ICD-10-CM

## 2022-03-10 DIAGNOSIS — K21.9 CHRONIC GERD: ICD-10-CM

## 2022-03-10 DIAGNOSIS — R06.09 DYSPNEA ON EXERTION: ICD-10-CM

## 2022-03-10 PROCEDURE — 99213 OFFICE O/P EST LOW 20 MIN: CPT | Performed by: INTERNAL MEDICINE

## 2022-03-10 PROCEDURE — 94726 PLETHYSMOGRAPHY LUNG VOLUMES: CPT | Performed by: INTERNAL MEDICINE

## 2022-03-10 PROCEDURE — 94729 DIFFUSING CAPACITY: CPT | Performed by: INTERNAL MEDICINE

## 2022-03-10 PROCEDURE — 94010 BREATHING CAPACITY TEST: CPT | Performed by: INTERNAL MEDICINE

## 2022-03-10 NOTE — PROGRESS NOTES
PULMONARY  NOTE    Chief Complaint     Polymyositis, groundglass infiltrates, GERD, remote smoker, diastolic dysfunction    History of Present Illness     Per my note of 2 days ago:  72-year-old female returns today for follow-up  Last saw her 8/13/2021     He has a history of polymyositis, followed by Dr. Hopper  She has been on prednisone, methotrexate, and Rituxan in the past  Based on some radiographic progression of groundglass infiltrates in the fall her medical regimen was modified  She negates that initially after her medications were changed she became more symptomatically controlled with increased medication dosing     Since I have seen her she feels more fatigued  She has noted some shortness of breath  She has a morning cough which is nonproductive  She has been able to avoid COVID-19 infection to her knowledge     Previous PFTs, done in August, revealed improved TLC and no reduction in diffusion capacity    Patient Active Problem List   Diagnosis   • Polymyositis (HCC)   • Ground glass opacities on Chest CT   • GERD   • Remote smoker (None since 1993)   • Diastolic dysfunction   • Dyspnea on exertion   • Gastic ulcer     Allergies   Allergen Reactions   • Ciprofloxacin Mental Status Change and Anxiety       Current Outpatient Medications:   •  Bempedoic Acid-Ezetimibe (Nexlizet) 180-10 MG tablet, Take 1 tablet by mouth Daily., Disp: , Rfl:   •  Biotin 5000 MCG tablet, Take 1 tablet by mouth Daily., Disp: , Rfl:   •  calcium carb-cholecalciferol 600-800 MG-UNIT tablet, , Disp: , Rfl:   •  Coenzyme Q-10 100 MG capsule, Take 100 mg by mouth Daily., Disp: , Rfl:   •  desvenlafaxine (PRISTIQ) 50 MG 24 hr tablet, Take 50 mg by mouth Daily., Disp: , Rfl:   •  fluticasone (FLONASE) 50 MCG/ACT nasal spray, 2 sprays into the nostril(s) as directed by provider Daily., Disp: , Rfl:   •  gabapentin (NEURONTIN) 600 MG tablet, Take 600 mg by mouth Daily., Disp: , Rfl:   •  levothyroxine (SYNTHROID, LEVOTHROID) 88  MCG tablet, Take 88 mcg by mouth Daily., Disp: , Rfl:   •  Multiple Vitamins-Minerals (CENTRUM SILVER 50+WOMEN) tablet, , Disp: , Rfl:   •  mycophenolate (CELLCEPT) 500 MG tablet, , Disp: , Rfl:   •  omeprazole (priLOSEC) 40 MG capsule, Take 1 capsule by mouth 2 (Two) Times a Day Before Meals. Take a half hour before breakfast, Disp: 60 capsule, Rfl: 11  •  potassium chloride (MICRO-K) 10 MEQ CR capsule, Take 10 mEq by mouth 2 (Two) Times a Day., Disp: , Rfl:   •  predniSONE (DELTASONE) 2.5 MG tablet, Take 2.5 mg by mouth Daily., Disp: , Rfl:   •  predniSONE (DELTASONE) 5 MG tablet, Take 10 mg by mouth 3 (Three) Times a Day., Disp: , Rfl:   •  riTUXimab (Rituxan) 100 MG/10ML solution injection, Administer RITUXAN 1000mg IV week 0, 2, then every 6 months, Disp: , Rfl:   •  triamterene-hydrochlorothiazide (MAXZIDE-25) 37.5-25 MG per tablet, Take 1 tablet by mouth Daily., Disp: , Rfl:   MEDICATION LIST AND ALLERGIES REVIEWED.    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    Review of Systems  ALSO REFER TO SCANNED ROS SHEET FROM SAME DATE.    /70 (BP Location: Right arm, Patient Position: Sitting, Cuff Size: Adult)   Pulse 68   Temp  "98.6 °F (37 °C)   Resp 16   Ht 167 cm (65.75\")   Wt 73.5 kg (162 lb 2 oz)   SpO2 99% Comment: room air at rest  BMI 26.37 kg/m²   Physical Exam  Vitals and nursing note reviewed.   Constitutional:       General: She is not in acute distress.     Appearance: She is well-developed. She is not diaphoretic.   HENT:      Head: Normocephalic and atraumatic.   Neck:      Thyroid: No thyromegaly.   Cardiovascular:      Rate and Rhythm: Normal rate and regular rhythm.      Heart sounds: No murmur heard.  Pulmonary:      Effort: Pulmonary effort is normal.      Breath sounds: No stridor.   Chest:   Breasts:      Right: No supraclavicular adenopathy.      Left: No supraclavicular adenopathy.       Lymphadenopathy:      Cervical: No cervical adenopathy.      Upper Body:      Right upper body: No supraclavicular or epitrochlear adenopathy.      Left upper body: No supraclavicular or epitrochlear adenopathy.   Skin:     General: Skin is warm and dry.   Neurological:      Mental Status: She is alert and oriented to person, place, and time.   Psychiatric:         Behavior: Behavior normal.         Results     PFTs are difficult to interpret due to patient's difficulty in keeping a tight seal  Lung volumes are questionable  However, at the very least she is been able to produce a spirometry that is stable in comparison to prior spirometry of August 2021    Immunization History   Administered Date(s) Administered   • COVID-19 (PFIZER) PURPLE CAP 02/03/2021, 03/02/2021, 09/14/2021   • Hepatitis A 12/04/2019     Problem List       ICD-10-CM ICD-9-CM   1. Polymyositis (HCC)  M33.20 710.4   2. Ground glass opacities on Chest CT  R91.8 793.19   3. GERD  K21.9 530.81   4. Remote smoker (None since 1993)  Z87.891 V15.82   5. Diastolic dysfunction  I51.89 429.9   6. Dyspnea on exertion  R06.00 786.09       Discussion     Both in August and on today's test she has difficulty with the test and keeping a seal however her lung volumes are " the same between the 2 sets of test  At this point, I am not sure where to go as far as suggestions regarding her medical therapy so would suggest a repeat HRCT    After she has had her HRCT I will talk with Dr. Hopper.    She is quite clear that she felt better on her prior medical regimen and does not feel anywhere near as good on the current medical regimen  Her medical regimen was changed to based on my concern about some increase in groundglass infiltrates on her CT scan    We will see how her CT scan looks but it might be prudent to change her back to her previous medical regimen which apparently achieved better symptomatic control.    Edwardo Drake MD  Note electronically signed    CC: Devon Lantigua MD

## 2022-03-11 DIAGNOSIS — R91.8 GROUND GLASS OPACITY PRESENT ON IMAGING OF LUNG: Primary | ICD-10-CM

## 2022-03-17 ENCOUNTER — HOSPITAL ENCOUNTER (OUTPATIENT)
Dept: CT IMAGING | Facility: HOSPITAL | Age: 72
Discharge: HOME | End: 2022-03-17
Admitting: NURSE PRACTITIONER

## 2022-03-17 DIAGNOSIS — R91.8 GROUND GLASS OPACITY PRESENT ON IMAGING OF LUNG: ICD-10-CM

## 2022-03-17 PROCEDURE — 71250 CT THORAX DX C-: CPT

## 2022-03-18 ENCOUNTER — APPOINTMENT (OUTPATIENT)
Dept: CT IMAGING | Facility: HOSPITAL | Age: 72
End: 2022-03-18

## 2022-05-19 ENCOUNTER — OFFICE VISIT (OUTPATIENT)
Dept: PULMONOLOGY | Facility: CLINIC | Age: 72
End: 2022-05-19

## 2022-05-19 VITALS
HEART RATE: 68 BPM | DIASTOLIC BLOOD PRESSURE: 68 MMHG | WEIGHT: 161 LBS | TEMPERATURE: 98.2 F | RESPIRATION RATE: 16 BRPM | BODY MASS INDEX: 26.18 KG/M2 | SYSTOLIC BLOOD PRESSURE: 118 MMHG | OXYGEN SATURATION: 98 %

## 2022-05-19 DIAGNOSIS — R91.8 GROUND GLASS OPACITY PRESENT ON IMAGING OF LUNG: Primary | ICD-10-CM

## 2022-05-19 DIAGNOSIS — I51.89 DIASTOLIC DYSFUNCTION: ICD-10-CM

## 2022-05-19 DIAGNOSIS — R06.09 DYSPNEA ON EXERTION: ICD-10-CM

## 2022-05-19 DIAGNOSIS — K21.9 CHRONIC GERD: ICD-10-CM

## 2022-05-19 DIAGNOSIS — M33.20 POLYMYOSITIS: ICD-10-CM

## 2022-05-19 DIAGNOSIS — Z87.891 FORMER SMOKER: ICD-10-CM

## 2022-05-19 PROCEDURE — 99214 OFFICE O/P EST MOD 30 MIN: CPT | Performed by: INTERNAL MEDICINE

## 2022-05-19 NOTE — PROGRESS NOTES
PULMONARY  NOTE    Chief Complaint     Polymyositis, groundglass infiltrates, GERD, remote smoker, diastolic dysfunction    History of Present Illness     72-year-old female returns today for follow-up  Last saw her 3/10/2022    She has a history of polymyositis, followed by Dr. Hopper  She had been on prednisone, methotrexate, and Rituxan in the past  Based on radiographic progression of groundglass infiltrates in the fall 2021 her medical regimen was modified    She returns today indicating that she is feeling a little bit better  Symptoms are little bit better controlled on her current medical regimen    We have done PFTs in the past and followed with serial chest imaging  Most recent CT scan of the chest revealed stability in her interstitial lung disease    Patient Active Problem List   Diagnosis   • Polymyositis (HCC)   • Ground glass opacities on Chest CT   • GERD   • Remote smoker (None since 1993)   • Diastolic dysfunction   • Dyspnea on exertion   • Gastic ulcer     Allergies   Allergen Reactions   • Ciprofloxacin Mental Status Change and Anxiety       Current Outpatient Medications:   •  Bempedoic Acid-Ezetimibe (Nexlizet) 180-10 MG tablet, Take 1 tablet by mouth Daily., Disp: , Rfl:   •  Biotin 5000 MCG tablet, Take 1 tablet by mouth Daily., Disp: , Rfl:   •  calcium carb-cholecalciferol 600-800 MG-UNIT tablet, , Disp: , Rfl:   •  Coenzyme Q-10 100 MG capsule, Take 100 mg by mouth Daily., Disp: , Rfl:   •  desvenlafaxine (PRISTIQ) 50 MG 24 hr tablet, Take 50 mg by mouth Daily., Disp: , Rfl:   •  fluticasone (FLONASE) 50 MCG/ACT nasal spray, 2 sprays into the nostril(s) as directed by provider Daily., Disp: , Rfl:   •  gabapentin (NEURONTIN) 600 MG tablet, Take 600 mg by mouth Daily., Disp: , Rfl:   •  levothyroxine (SYNTHROID, LEVOTHROID) 88 MCG tablet, Take 88 mcg by mouth Daily., Disp: , Rfl:   •  Multiple Vitamins-Minerals (CENTRUM SILVER 50+WOMEN) tablet, , Disp: , Rfl:   •  mycophenolate  (CELLCEPT) 500 MG tablet, 500 mg 2 (Two) Times a Day., Disp: , Rfl:   •  omeprazole (priLOSEC) 40 MG capsule, Take 1 capsule by mouth 2 (Two) Times a Day Before Meals. Take a half hour before breakfast, Disp: 60 capsule, Rfl: 11  •  potassium chloride (MICRO-K) 10 MEQ CR capsule, Take 10 mEq by mouth 2 (Two) Times a Day., Disp: , Rfl:   •  predniSONE (DELTASONE) 5 MG tablet, Take 3 mg by mouth Daily., Disp: , Rfl:   •  riTUXimab (Rituxan) 100 MG/10ML solution injection, Administer RITUXAN 1000mg IV week 0, 2, then every 6 months, Disp: , Rfl:   •  triamterene-hydrochlorothiazide (MAXZIDE-25) 37.5-25 MG per tablet, Take 1 tablet by mouth Daily., Disp: , Rfl:   •  predniSONE (DELTASONE) 2.5 MG tablet, Take 2.5 mg by mouth Daily., Disp: , Rfl:   MEDICATION LIST AND ALLERGIES REVIEWED.    Family History   Problem Relation Age of Onset   • Alzheimer's disease Mother    • COPD Father    • Heart failure Father    • Diabetes Maternal Grandmother    • Heart failure Maternal Grandfather      Social History     Tobacco Use   • Smoking status: Former Smoker     Packs/day: 2.00     Years: 15.00     Pack years: 30.00     Types: Cigarettes     Quit date: 1993     Years since quittin.8   • Smokeless tobacco: Never Used   Substance Use Topics   • Alcohol use: Yes   • Drug use: Never     Social History     Social History Narrative    Single    Has worked in [employer] for [employer]    Has 2 cats    Previously smoked up to 2 packs of cigarettes per day for about 15 years but stopped smoking completely in     Drinks up to 2 alcoholic beverages on a weekly basis     FAMILY AND SOCIAL HISTORY REVIEWED.    Review of Systems  ALSO REFER TO SCANNED ROS SHEET FROM SAME DATE.    /68   Pulse 68   Temp 98.2 °F (36.8 °C)   Resp 16   Wt 73 kg (161 lb)   SpO2 98%   BMI 26.18 kg/m²   Physical Exam  Vitals and nursing note reviewed.   Constitutional:       General: She is not in acute distress.     Appearance:  She is well-developed. She is not diaphoretic.   HENT:      Head: Normocephalic and atraumatic.   Neck:      Thyroid: No thyromegaly.   Cardiovascular:      Rate and Rhythm: Normal rate and regular rhythm.      Heart sounds: No murmur heard.  Pulmonary:      Effort: Pulmonary effort is normal.      Breath sounds: Normal breath sounds. No stridor.   Chest:   Breasts:      Right: No supraclavicular adenopathy.      Left: No supraclavicular adenopathy.       Lymphadenopathy:      Cervical: No cervical adenopathy.      Upper Body:      Right upper body: No supraclavicular or epitrochlear adenopathy.      Left upper body: No supraclavicular or epitrochlear adenopathy.   Skin:     General: Skin is warm and dry.   Neurological:      Mental Status: She is alert and oriented to person, place, and time.   Psychiatric:         Behavior: Behavior normal.         Results     CT scan of the chest reviewed on PACS  Stability in previously noted interstitial lung disease with no progression or new findings    Immunization History   Administered Date(s) Administered   • COVID-19 (PFIZER) PURPLE CAP 02/03/2021, 03/02/2021, 09/14/2021   • Covid-19 (Pfizer) Gray Cap 04/02/2022   • Hepatitis A 12/04/2019     Problem List       ICD-10-CM ICD-9-CM   1. Ground glass opacities on Chest CT  R91.8 793.19   2. Polymyositis (HCC)  M33.20 710.4   3. Remote smoker (None since 1993)  Z87.891 V15.82   4. Diastolic dysfunction  I51.89 429.9   5. Dyspnea on exertion  R06.00 786.09   6. GERD  K21.9 530.81       Discussion     Symptomatically she is somewhat better  No new or progressing respiratory symptoms  CT scan of the chest is stable  Most recent PFTs are difficult to interpret but appears stable in comparison to prior PFTs    At this point I encourage continued medical regimen as outlined by Dr. Hopper    At some point we will reimage her chest, probably near the end of the year    Unfortunately, PFTs have been difficult to interpret due to  efforts and are of only questional benefit for following disease progression    I will plan to see her back in 3 to 4 months for follow-up    Moderate level of Medical Decision Making complexity based on 2 or more chronic stable illnesses and prescription drug management.    Edwardo Drake MD  Note electronically signed    CC: Flory Delgado, DO

## 2022-09-30 ENCOUNTER — TRANSCRIBE ORDERS (OUTPATIENT)
Dept: FAMILY MEDICINE CLINIC | Facility: CLINIC | Age: 72
End: 2022-09-30

## 2022-09-30 ENCOUNTER — HOSPITAL ENCOUNTER (OUTPATIENT)
Dept: GENERAL RADIOLOGY | Facility: HOSPITAL | Age: 72
Discharge: HOME | End: 2022-09-30
Admitting: FAMILY MEDICINE

## 2022-09-30 DIAGNOSIS — R05.9 COUGH: Primary | ICD-10-CM

## 2022-09-30 PROCEDURE — 71046 X-RAY EXAM CHEST 2 VIEWS: CPT

## 2022-10-12 ENCOUNTER — OFFICE VISIT (OUTPATIENT)
Dept: PULMONOLOGY | Facility: CLINIC | Age: 72
End: 2022-10-12

## 2022-10-12 VITALS
SYSTOLIC BLOOD PRESSURE: 126 MMHG | WEIGHT: 169.6 LBS | RESPIRATION RATE: 12 BRPM | HEIGHT: 66 IN | BODY MASS INDEX: 27.26 KG/M2 | TEMPERATURE: 98 F | HEART RATE: 75 BPM | OXYGEN SATURATION: 98 % | DIASTOLIC BLOOD PRESSURE: 74 MMHG

## 2022-10-12 DIAGNOSIS — M33.20 POLYMYOSITIS: ICD-10-CM

## 2022-10-12 DIAGNOSIS — I51.89 DIASTOLIC DYSFUNCTION: ICD-10-CM

## 2022-10-12 DIAGNOSIS — R91.8 GROUND GLASS OPACITY PRESENT ON IMAGING OF LUNG: Primary | ICD-10-CM

## 2022-10-12 DIAGNOSIS — Z87.891 FORMER SMOKER: ICD-10-CM

## 2022-10-12 PROCEDURE — 99214 OFFICE O/P EST MOD 30 MIN: CPT | Performed by: INTERNAL MEDICINE

## 2022-10-12 RX ORDER — MYCOPHENOLATE MOFETIL 500 MG/1
3 TABLET ORAL EVERY 12 HOURS
COMMUNITY
Start: 2022-07-25 | End: 2022-12-20 | Stop reason: SDUPTHER

## 2022-10-12 RX ORDER — CODEINE PHOSPHATE AND GUAIFENESIN 10; 100 MG/5ML; MG/5ML
SOLUTION ORAL
COMMUNITY
Start: 2022-09-30 | End: 2023-03-23

## 2022-10-12 NOTE — PROGRESS NOTES
PULMONARY  NOTE    Chief Complaint     Polymyositis, groundglass infiltrates, GERD, remote smoker, diastolic dysfunction    History of Present Illness     72-year-old female returns today for follow-up  I last saw her 5/19/2022    She has a history of polymyositis, followed by Dr. Hopper  Previous therapy had been prednisone, methotrexate, and Rituxan  Chest imaging in the fall 2021 revealed progression of groundglass infiltrates and her medical regimen was modified    Since I last saw her she indicates that she had been doing much better  She had started on a regular exercise program    Then, at the end of September, despite being vaccinated she experienced COVID-19  This was treated as an outpatient  She received Paxlovid  She initially did fairly well but then just has not felt great and is continuing to recover    Most recent chest CT revealed stability in the interstitial disease although our plan was to follow-up on a repeat CT scan of the chest this fall    No regular reflux symptoms    Patient Active Problem List   Diagnosis   • Polymyositis (HCC)   • Ground glass opacities on Chest CT   • GERD   • Remote smoker (None since 1993)   • Diastolic dysfunction   • Dyspnea on exertion   • Gastic ulcer     Allergies   Allergen Reactions   • Ciprofloxacin Mental Status Change and Anxiety       Current Outpatient Medications:   •  Bempedoic Acid-Ezetimibe (Nexlizet) 180-10 MG tablet, Take 1 tablet by mouth Daily., Disp: , Rfl:   •  Biotin 5000 MCG tablet, Take 1 tablet by mouth Daily., Disp: , Rfl:   •  calcium carb-cholecalciferol 600-800 MG-UNIT tablet, , Disp: , Rfl:   •  Coenzyme Q-10 100 MG capsule, Take 100 mg by mouth Daily., Disp: , Rfl:   •  desvenlafaxine (PRISTIQ) 50 MG 24 hr tablet, Take 50 mg by mouth Daily., Disp: , Rfl:   •  fluticasone (FLONASE) 50 MCG/ACT nasal spray, 2 sprays into the nostril(s) as directed by provider Daily., Disp: , Rfl:   •  gabapentin (NEURONTIN) 600 MG tablet, Take 600 mg  by mouth Daily., Disp: , Rfl:   •  guaiFENesin-codeine (ROMILAR-AC) 100-10 MG/5ML syrup, , Disp: , Rfl:   •  levothyroxine (SYNTHROID, LEVOTHROID) 88 MCG tablet, Take 88 mcg by mouth Daily., Disp: , Rfl:   •  Multiple Vitamins-Minerals (CENTRUM SILVER 50+WOMEN) tablet, , Disp: , Rfl:   •  mycophenolate (CELLCEPT) 500 MG tablet, Take 3 tablets by mouth Every 12 (Twelve) Hours., Disp: , Rfl:   •  omeprazole (priLOSEC) 40 MG capsule, Take 1 capsule by mouth 2 (Two) Times a Day Before Meals. Take a half hour before breakfast, Disp: 60 capsule, Rfl: 11  •  potassium chloride (MICRO-K) 10 MEQ CR capsule, Take 10 mEq by mouth 2 (Two) Times a Day., Disp: , Rfl:   •  predniSONE 1 MG tablet delayed-release, , Disp: , Rfl:   •  riTUXimab (Rituxan) 100 MG/10ML solution injection, Administer RITUXAN 1000mg IV week 0, 2, then every 6 months, Disp: , Rfl:   •  triamterene-hydrochlorothiazide (MAXZIDE-25) 37.5-25 MG per tablet, Take 1 tablet by mouth Daily., Disp: , Rfl:   •  mycophenolate (CELLCEPT) 500 MG tablet, 500 mg 2 (Two) Times a Day., Disp: , Rfl:   •  predniSONE (DELTASONE) 2.5 MG tablet, Take 2.5 mg by mouth Daily., Disp: , Rfl:   •  predniSONE (DELTASONE) 5 MG tablet, Take 3 mg by mouth Daily., Disp: , Rfl:   MEDICATION LIST AND ALLERGIES REVIEWED.    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    Review of Systems  ALSO REFER TO SCANNED ROS SHEET FROM SAME DATE.    /74 (BP Location: Left arm, Patient Position: Sitting, Cuff Size: Adult)   Pulse 75   Temp 98 °F (36.7 °C) (Infrared)   Resp 12   Ht 167 cm (65.75\")   Wt 76.9 kg (169 lb 9.6 oz)   SpO2 98%   BMI 27.58 kg/m²   Physical Exam  Vitals and nursing note reviewed.   Constitutional:       General: She is not in acute distress.     Appearance: She is well-developed. She is not diaphoretic.   HENT:      Head: Normocephalic and atraumatic.   Neck:      Thyroid: No thyromegaly.   Cardiovascular:      Rate and Rhythm: Normal rate and regular rhythm.      Heart sounds: Normal heart sounds. No murmur heard.  Pulmonary:      Effort: Pulmonary effort is normal.      Breath sounds: Normal breath sounds. No stridor.   Lymphadenopathy:      Cervical: No cervical adenopathy.      Upper Body:      Right upper body: No supraclavicular or epitrochlear adenopathy.      Left upper body: No supraclavicular or epitrochlear adenopathy.   Skin:     General: Skin is warm and dry.   Neurological:      Mental Status: She is alert and oriented to person, place, and time.   Psychiatric:         Behavior: Behavior normal.         Results     Chest x-ray from 9/30/2020 reviewed on PACS  Prominent interstitial pattern but no consolidation or effusions  Unchanged in comparison to prior chest x-ray    Immunization History   Administered Date(s) Administered   • COVID-19 (PFIZER) PURPLE CAP 02/03/2021, 03/02/2021, 09/14/2021   • Covid-19 (Pfizer) Gray Cap 04/02/2022   • Hepatitis A 12/04/2019     Problem List       ICD-10-CM ICD-9-CM   1. Ground glass opacities on Chest CT  R91.8 793.19   2. Polymyositis (HCC)  M33.20 710.4   3. Remote smoker (None since 1993)  Z87.891 V15.82   4. Diastolic dysfunction  I51.89 429.9 "       Discussion     She had been doing quite well until she developed COVID-19  The Paxlovid helped initially but she feels that she is continuing to just feel poorly with fatigue and decreased exercise capacity    She is continuing to have a lot of sinus drainage and postnasal drip  I am going to add Atrovent nasal spray    I want to follow-up on her groundglass infiltrates but we will put that off till the end of the year  I will see her back after a noncontrast CT scan of the chest    She will continue to follow-up with Dr. Hopper and remains on the same medical regimen    Moderate level of Medical Decision Making complexity based on 2 or more chronic stable illnesses and prescription drug management.    Edwardo Drake MD  Note electronically signed    CC: Flory Delgado, DO

## 2022-10-13 DIAGNOSIS — R91.8 GROUND GLASS OPACITY PRESENT ON IMAGING OF LUNG: Primary | ICD-10-CM

## 2022-10-14 RX ORDER — IPRATROPIUM BROMIDE 21 UG/1
2 SPRAY, METERED NASAL EVERY 12 HOURS
Qty: 30 ML | Refills: 11 | Status: SHIPPED | OUTPATIENT
Start: 2022-10-14 | End: 2023-03-23

## 2022-11-04 ENCOUNTER — HOSPITAL ENCOUNTER (OUTPATIENT)
Dept: CT IMAGING | Facility: HOSPITAL | Age: 72
Discharge: HOME | End: 2022-11-04
Admitting: INTERNAL MEDICINE

## 2022-11-04 DIAGNOSIS — R91.8 GROUND GLASS OPACITY PRESENT ON IMAGING OF LUNG: ICD-10-CM

## 2022-11-04 PROCEDURE — 71250 CT THORAX DX C-: CPT

## 2022-12-20 ENCOUNTER — OFFICE VISIT (OUTPATIENT)
Dept: PULMONOLOGY | Facility: CLINIC | Age: 72
End: 2022-12-20

## 2022-12-20 VITALS
TEMPERATURE: 97.8 F | BODY MASS INDEX: 28.54 KG/M2 | SYSTOLIC BLOOD PRESSURE: 120 MMHG | OXYGEN SATURATION: 100 % | WEIGHT: 177.6 LBS | DIASTOLIC BLOOD PRESSURE: 80 MMHG | HEIGHT: 66 IN | HEART RATE: 74 BPM

## 2022-12-20 DIAGNOSIS — Z87.891 FORMER SMOKER: ICD-10-CM

## 2022-12-20 DIAGNOSIS — R06.09 DYSPNEA ON EXERTION: ICD-10-CM

## 2022-12-20 DIAGNOSIS — M33.20 POLYMYOSITIS: ICD-10-CM

## 2022-12-20 DIAGNOSIS — I51.89 DIASTOLIC DYSFUNCTION: ICD-10-CM

## 2022-12-20 DIAGNOSIS — K25.3 ACUTE GASTRIC ULCER WITHOUT HEMORRHAGE OR PERFORATION: ICD-10-CM

## 2022-12-20 DIAGNOSIS — R91.8 GROUND GLASS OPACITY PRESENT ON IMAGING OF LUNG: Primary | ICD-10-CM

## 2022-12-20 DIAGNOSIS — K21.9 CHRONIC GERD: ICD-10-CM

## 2022-12-20 PROBLEM — Z86.16 HISTORY OF COVID-19: Status: ACTIVE | Noted: 2022-12-20

## 2022-12-20 PROCEDURE — 99214 OFFICE O/P EST MOD 30 MIN: CPT | Performed by: INTERNAL MEDICINE

## 2022-12-20 NOTE — PROGRESS NOTES
PULMONARY  NOTE    Chief Complaint     Polymyositis, groundglass infiltrates, GERD, remote smoker, diastolic dysfunction    History of Present Illness     72-year-old female returns today for follow-up  I last saw her 10/12/2022    She has a history of polymyositis  She is followed by Dr. Hopper  Previous therapy has included prednisone, methotrexate, and Rituxan  In the fall 2021 chest imaging revealed worsening groundglass infiltrates and her medical regimen was modified    She had been doing fairly well but then developed COVID-19 despite being vaccinated  This was in the end of September 2022 and she received Paxlovid  She does feel that she has improved for the most part    Repeat chest imaging is as noted below    She has had no hemoptysis    Patient Active Problem List   Diagnosis   • Polymyositis (HCC)   • Ground glass opacities on Chest CT   • GERD   • Remote smoker (None since 1993)   • Diastolic dysfunction   • Dyspnea on exertion   • Gastic ulcer   • History of COVID-19 (9/2022)     Allergies   Allergen Reactions   • Ciprofloxacin Mental Status Change and Anxiety       Current Outpatient Medications:   •  Bempedoic Acid-Ezetimibe (Nexlizet) 180-10 MG tablet, Take 1 tablet by mouth Daily., Disp: , Rfl:   •  Biotin 5000 MCG tablet, Take 1 tablet by mouth Daily., Disp: , Rfl:   •  calcium carb-cholecalciferol 600-800 MG-UNIT tablet, , Disp: , Rfl:   •  Coenzyme Q-10 100 MG capsule, Take 100 mg by mouth Daily., Disp: , Rfl:   •  desvenlafaxine (PRISTIQ) 50 MG 24 hr tablet, Take 50 mg by mouth Daily., Disp: , Rfl:   •  fluticasone (FLONASE) 50 MCG/ACT nasal spray, 2 sprays into the nostril(s) as directed by provider Daily., Disp: , Rfl:   •  gabapentin (NEURONTIN) 600 MG tablet, Take 600 mg by mouth Daily., Disp: , Rfl:   •  guaiFENesin-codeine (ROMILAR-AC) 100-10 MG/5ML syrup, , Disp: , Rfl:   •  ipratropium (ATROVENT) 0.03 % nasal spray, 2 sprays into the nostril(s) as directed by provider Every 12  "(Twelve) Hours., Disp: 30 mL, Rfl: 11  •  levothyroxine (SYNTHROID, LEVOTHROID) 88 MCG tablet, Take 88 mcg by mouth Daily., Disp: , Rfl:   •  Multiple Vitamins-Minerals (CENTRUM SILVER 50+WOMEN) tablet, , Disp: , Rfl:   •  mycophenolate (CELLCEPT) 500 MG tablet, 1,500 mg 2 (Two) Times a Day., Disp: , Rfl:   •  omeprazole (priLOSEC) 40 MG capsule, Take 1 capsule by mouth 2 (Two) Times a Day Before Meals. Take a half hour before breakfast, Disp: 60 capsule, Rfl: 11  •  potassium chloride (MICRO-K) 10 MEQ CR capsule, Take 10 mEq by mouth 2 (Two) Times a Day., Disp: , Rfl:   •  predniSONE 1 MG tablet delayed-release, 2 mg., Disp: , Rfl:   •  riTUXimab (Rituxan) 100 MG/10ML solution injection, Administer RITUXAN 1000mg IV week 0, 2, then every 6 months, Disp: , Rfl:   •  triamterene-hydrochlorothiazide (MAXZIDE-25) 37.5-25 MG per tablet, Take 1 tablet by mouth Daily., Disp: , Rfl:   MEDICATION LIST AND ALLERGIES REVIEWED.    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    Review of Systems  ALSO REFER TO SCANNED ROS SHEET FROM SAME DATE.    /80   Pulse 74   Temp 97.8 °F (36.6 °C)   Ht 167 cm (65.75\")   Wt 80.6 kg (177 lb 9.6 oz)   SpO2 100% " Comment: resting, room air  BMI 28.88 kg/m²   Physical Exam  Vitals and nursing note reviewed.   Constitutional:       General: She is not in acute distress.     Appearance: She is well-developed. She is not diaphoretic.   HENT:      Head: Normocephalic and atraumatic.   Neck:      Thyroid: No thyromegaly.   Cardiovascular:      Rate and Rhythm: Normal rate and regular rhythm.      Heart sounds: Normal heart sounds. No murmur heard.  Pulmonary:      Effort: Pulmonary effort is normal.      Breath sounds: Normal breath sounds. No stridor.   Lymphadenopathy:      Cervical: No cervical adenopathy.      Upper Body:      Right upper body: No supraclavicular or epitrochlear adenopathy.      Left upper body: No supraclavicular or epitrochlear adenopathy.   Skin:     General: Skin is warm and dry.   Neurological:      Mental Status: She is alert and oriented to person, place, and time.   Psychiatric:         Behavior: Behavior normal.         Results     CT scan of chest from 11/14/2022 continues to show groundglass infiltrates although some interval improvement    Immunization History   Administered Date(s) Administered   • COVID-19 (PFIZER) PURPLE CAP 02/03/2021, 03/02/2021, 09/14/2021, 09/16/2021   • Covid-19 (Pfizer) Gray Cap 04/02/2022   • Fluzone High Dose =>65 Years ([employer] ONLY) 09/14/2021   • Hepatitis A 12/04/2019   • INFLUENZA SPLIT TRI 09/16/2021     Problem List       ICD-10-CM ICD-9-CM   1. Ground glass opacities on Chest CT  R91.8 793.19   2. GERD  K21.9 530.81   3. Remote smoker (None since 1993)  Z87.891 V15.82   4. Diastolic dysfunction  I51.89 429.9   5. Dyspnea on exertion  R06.09 786.09   6. Polymyositis (HCC)  M33.20 710.4   7. Gastic ulcer  K25.3 531.30       Discussion     Clinically she is stable  CT scan of the chest is somewhat improved although she still has some groundglass infiltrates    At this point we will just continue to follow her clinically  I I am going to get PFTs when she comes back  in the spring to get a quantitative assessment of lung function  Consider reimaging next year depending upon how her PFTs look    She will continue to follow-up with Dr. Hopper    I will plan to see her back in 3 to 4 months with PFTs    Moderate level of Medical Decision Making complexity based on 2 or more chronic stable illnesses and prescription drug management.    Edwardo Drake MD  Note electronically signed    CC: Flory Delgado, DO

## 2023-03-23 ENCOUNTER — OFFICE VISIT (OUTPATIENT)
Dept: PULMONOLOGY | Facility: CLINIC | Age: 73
End: 2023-03-23
Payer: MEDICARE

## 2023-03-23 VITALS
TEMPERATURE: 96.4 F | HEART RATE: 69 BPM | DIASTOLIC BLOOD PRESSURE: 72 MMHG | WEIGHT: 182.2 LBS | OXYGEN SATURATION: 98 % | SYSTOLIC BLOOD PRESSURE: 120 MMHG | BODY MASS INDEX: 29.28 KG/M2 | HEIGHT: 66 IN

## 2023-03-23 DIAGNOSIS — Z86.16 HISTORY OF COVID-19: ICD-10-CM

## 2023-03-23 DIAGNOSIS — R06.09 DYSPNEA ON EXERTION: ICD-10-CM

## 2023-03-23 DIAGNOSIS — R91.8 GROUND GLASS OPACITY PRESENT ON IMAGING OF LUNG: Primary | ICD-10-CM

## 2023-03-23 DIAGNOSIS — K21.9 CHRONIC GERD: ICD-10-CM

## 2023-03-23 DIAGNOSIS — M33.20 POLYMYOSITIS: ICD-10-CM

## 2023-03-23 DIAGNOSIS — Z87.891 FORMER SMOKER: ICD-10-CM

## 2023-03-23 DIAGNOSIS — I51.89 DIASTOLIC DYSFUNCTION: ICD-10-CM

## 2023-03-23 PROCEDURE — 94375 RESPIRATORY FLOW VOLUME LOOP: CPT | Performed by: INTERNAL MEDICINE

## 2023-03-23 PROCEDURE — 99214 OFFICE O/P EST MOD 30 MIN: CPT | Performed by: INTERNAL MEDICINE

## 2023-03-23 PROCEDURE — 94726 PLETHYSMOGRAPHY LUNG VOLUMES: CPT | Performed by: INTERNAL MEDICINE

## 2023-03-23 PROCEDURE — 94729 DIFFUSING CAPACITY: CPT | Performed by: INTERNAL MEDICINE

## 2023-03-23 PROCEDURE — 1159F MED LIST DOCD IN RCRD: CPT | Performed by: INTERNAL MEDICINE

## 2023-03-23 PROCEDURE — 1160F RVW MEDS BY RX/DR IN RCRD: CPT | Performed by: INTERNAL MEDICINE

## 2023-03-23 RX ORDER — GABAPENTIN 300 MG/1
CAPSULE ORAL
COMMUNITY
Start: 2023-03-13

## 2023-03-23 RX ORDER — CLARITHROMYCIN 500 MG/1
TABLET, COATED ORAL
COMMUNITY
Start: 2023-03-20

## 2023-03-23 RX ORDER — AZELASTINE 1 MG/ML
2 SPRAY, METERED NASAL 2 TIMES DAILY
COMMUNITY

## 2023-03-23 NOTE — PROGRESS NOTES
PULMONARY  NOTE    Chief Complaint     Polymyositis, groundglass infiltrates, GERD, remote smoker, diastolic dysfunction, perennial rhinitis    History of Present Illness     73-year-old female returns today for follow-up  The last saw her 12/20/2022    She has a history of polymyositis, followed by Dr. Hopper  She is on low-dose prednisone, mycophenolate and Rituxan  She is on 1 mg of prednisone a day and 3000 mg of mycophenolate a day  Chest imaging in the fall 2021 revealed worsening groundglass opacities and her medical regimen was modified at that time    Most recent CT scan of the chest revealed improvement in groundglass opacities  Serial PFTs of been difficult to interpret    Patient developed COVID-19 in the fall and since that time is had persistent sinus drainage postnasal drip  She is seeing Dr. Sharma, ENT  Recent antibiotic for URI symptoms    Patient Active Problem List   Diagnosis   • Polymyositis (HCC)   • Ground glass opacities on Chest CT   • GERD   • Remote smoker (None since 1993)   • Diastolic dysfunction   • Dyspnea on exertion   • Gastic ulcer   • History of COVID-19 (9/2022)     Allergies   Allergen Reactions   • Ciprofloxacin Mental Status Change and Anxiety       Current Outpatient Medications:   •  azelastine (ASTELIN) 0.1 % nasal spray, 2 sprays into the nostril(s) as directed by provider 2 (Two) Times a Day. Use in each nostril as directed, Disp: , Rfl:   •  Bempedoic Acid-Ezetimibe (Nexlizet) 180-10 MG tablet, Take 1 tablet by mouth Daily., Disp: , Rfl:   •  Biotin 5000 MCG tablet, Take 1 tablet by mouth Daily., Disp: , Rfl:   •  calcium carb-cholecalciferol 600-800 MG-UNIT tablet, , Disp: , Rfl:   •  clarithromycin (BIAXIN) 500 MG tablet, , Disp: , Rfl:   •  Coenzyme Q-10 100 MG capsule, Take 1 capsule by mouth Daily., Disp: , Rfl:   •  desvenlafaxine (PRISTIQ) 50 MG 24 hr tablet, Take 1 tablet by mouth Daily., Disp: , Rfl:   •  gabapentin (NEURONTIN) 300 MG capsule, , Disp: ,  Rfl:   •  levothyroxine (SYNTHROID, LEVOTHROID) 88 MCG tablet, Take 1 tablet by mouth Daily., Disp: , Rfl:   •  Multiple Vitamins-Minerals (CENTRUM SILVER 50+WOMEN) tablet, , Disp: , Rfl:   •  mycophenolate (CELLCEPT) 500 MG tablet, 3 tablets 2 (Two) Times a Day., Disp: , Rfl:   •  omeprazole (priLOSEC) 40 MG capsule, Take 1 capsule by mouth 2 (Two) Times a Day Before Meals. Take a half hour before breakfast, Disp: 60 capsule, Rfl: 11  •  potassium chloride (MICRO-K) 10 MEQ CR capsule, Take 1 capsule by mouth 2 (Two) Times a Day., Disp: , Rfl:   •  predniSONE 1 MG tablet delayed-release, 2 mg., Disp: , Rfl:   •  riTUXimab (Rituxan) 100 MG/10ML solution injection, Administer RITUXAN 1000mg IV week 0, 2, then every 6 months, Disp: , Rfl:   •  triamterene-hydrochlorothiazide (MAXZIDE-25) 37.5-25 MG per tablet, Take 1 tablet by mouth Daily., Disp: , Rfl:   •  fluticasone (FLONASE) 50 MCG/ACT nasal spray, 2 sprays into the nostril(s) as directed by provider Daily. (Patient not taking: Reported on 3/23/2023), Disp: , Rfl:   •  gabapentin (NEURONTIN) 600 MG tablet, Take 600 mg by mouth Daily. (Patient not taking: Reported on 3/23/2023), Disp: , Rfl:   MEDICATION LIST AND ALLERGIES REVIEWED.    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    Review of Systems  ALSO REFER TO SCANNED ROS SHEET FROM SAME DATE.    /72 (BP Location: Right arm, Patient Position: Sitting, Cuff Size: Adult)   Pulse 69   Temp 96.4 °F (35.8 °C) (Infrared)   Ht 167 cm (65.75\")   Wt 82.6 kg (182 lb 3.2 oz)   SpO2 98% Comment: resting room air  BMI 29.63 kg/m²   Physical Exam  Vitals and nursing note reviewed.   Constitutional:       General: She is not in acute distress.     Appearance: She is well-developed. She is not diaphoretic.   HENT:      Head: Normocephalic and atraumatic.   Neck:      Thyroid: No thyromegaly.   Cardiovascular:      Rate and Rhythm: Normal rate and regular rhythm.      Heart sounds: Normal heart sounds. No murmur heard.  Pulmonary:      Effort: Pulmonary effort is normal.      Breath sounds: Normal breath sounds. No stridor.   Lymphadenopathy:      Cervical: No cervical adenopathy.      Upper Body:      Right upper body: No supraclavicular or epitrochlear adenopathy.      Left upper body: No supraclavicular or epitrochlear adenopathy.   Skin:     General: Skin is warm and dry.   Neurological:      Mental Status: She is alert and oriented to person, place, and time.   Psychiatric:         Behavior: Behavior normal.         Results     CT scan of chest from November 2022 again reviewed on PACS  Persistent groundglass opacities although improved over the interval    PFTs today are difficult to interpret  Airway obstruction, restriction, and a reduced diffusion capacity cannot be excluded  However lung volumes are stable in comparison to PFTs dating back to 2021    Immunization History   Administered Date(s) Administered   • COVID-19 (PFIZER) BIVALENT BOOSTER 12+YRS 01/04/2023   • COVID-19 (PFIZER) PURPLE CAP 02/03/2021, 03/02/2021, 09/16/2021   • Covid-19 (Pfizer) Gray Cap 04/02/2022   • Fluzone High Dose =>65 Years ([employer] ONLY) 09/14/2021   • Hepatitis A " 12/04/2019   • INFLUENZA SPLIT TRI 09/16/2021     Problem List       ICD-10-CM ICD-9-CM   1. Ground glass opacities on Chest CT  R91.8 793.19   2. Polymyositis (HCC)  M33.20 710.4   3. History of COVID-19 (9/2022)  Z86.16 V12.09   4. Remote smoker (None since 1993)  Z87.891 V15.82   5. Diastolic dysfunction  I51.89 429.9   6. Dyspnea on exertion  R06.09 786.09   7. GERD  K21.9 530.81       Discussion     Clinically stable and PFT values are stable although difficult to interpret  Most recent CT scan of the chest revealed improvement in groundglass opacities    She remains on low-dose prednisone, mycophenolate, and Rituxan    I encouraged her to follow-up with Dr. Hopper    I will plan to see her back in 6 months  We will get a 6-month CT scan of the chest to reevaluate her groundglass infiltrates, however    Moderate level of Medical Decision Making complexity based on 1 undiagnosed new problem or 2 stable chronic conditions, independent interpretation of tests, and/or prescription drug management     Edwardo Drake MD  Note electronically signed    CC: Flory Delgado DO

## 2023-03-24 DIAGNOSIS — R91.8 GROUND GLASS OPACITY PRESENT ON IMAGING OF LUNG: Primary | ICD-10-CM

## 2023-05-01 ENCOUNTER — HOSPITAL ENCOUNTER (OUTPATIENT)
Dept: CT IMAGING | Facility: HOSPITAL | Age: 73
Discharge: HOME | End: 2023-05-01
Admitting: INTERNAL MEDICINE
Payer: MEDICARE

## 2023-05-01 DIAGNOSIS — R91.8 GROUND GLASS OPACITY PRESENT ON IMAGING OF LUNG: ICD-10-CM

## 2023-05-01 PROCEDURE — 71250 CT THORAX DX C-: CPT

## 2023-05-03 ENCOUNTER — DOCUMENTATION (OUTPATIENT)
Dept: PULMONOLOGY | Facility: CLINIC | Age: 73
End: 2023-05-03
Payer: MEDICARE

## 2023-05-03 NOTE — PROGRESS NOTES
Patient underwent a CT scan of the chest which I reviewed on PACS  Still has some hazy groundglass changes primarily in the bases  Stable in comparison of the prior film which was improved  I discussed these results with the patient on the phone  I will plan to see her back in 6 months as scheduled

## 2023-09-25 RX ORDER — METHYLPREDNISOLONE SODIUM SUCCINATE 125 MG/2ML
125 INJECTION, POWDER, LYOPHILIZED, FOR SOLUTION INTRAMUSCULAR; INTRAVENOUS ONCE
Status: CANCELLED
Start: 2023-10-05 | End: 2023-10-05

## 2023-09-25 RX ORDER — MEPERIDINE HYDROCHLORIDE 50 MG/ML
25 INJECTION INTRAMUSCULAR; INTRAVENOUS; SUBCUTANEOUS
Status: CANCELLED | OUTPATIENT
Start: 2023-10-05

## 2023-09-25 RX ORDER — ACETAMINOPHEN 325 MG/1
650 TABLET ORAL ONCE
Status: CANCELLED
Start: 2023-10-05 | End: 2023-10-05

## 2023-09-25 RX ORDER — FAMOTIDINE 10 MG/ML
20 INJECTION, SOLUTION INTRAVENOUS AS NEEDED
Status: CANCELLED | OUTPATIENT
Start: 2023-10-05

## 2023-09-25 RX ORDER — CETIRIZINE HYDROCHLORIDE 10 MG/1
10 TABLET ORAL ONCE
Status: CANCELLED
Start: 2023-10-05 | End: 2023-10-05

## 2023-09-25 RX ORDER — DIPHENHYDRAMINE HYDROCHLORIDE 50 MG/ML
50 INJECTION INTRAMUSCULAR; INTRAVENOUS AS NEEDED
Status: CANCELLED | OUTPATIENT
Start: 2023-10-05

## 2023-10-05 ENCOUNTER — HOSPITAL ENCOUNTER (OUTPATIENT)
Dept: ONCOLOGY | Facility: HOSPITAL | Age: 73
Discharge: HOME | End: 2023-10-05
Admitting: INTERNAL MEDICINE
Payer: MEDICARE

## 2023-10-05 VITALS
RESPIRATION RATE: 18 BRPM | BODY MASS INDEX: 30.82 KG/M2 | HEART RATE: 67 BPM | SYSTOLIC BLOOD PRESSURE: 125 MMHG | DIASTOLIC BLOOD PRESSURE: 63 MMHG | HEIGHT: 65 IN | TEMPERATURE: 97.5 F | WEIGHT: 185 LBS

## 2023-10-05 DIAGNOSIS — M33.20 POLYMYOSITIS: Primary | ICD-10-CM

## 2023-10-05 PROCEDURE — 96375 TX/PRO/DX INJ NEW DRUG ADDON: CPT

## 2023-10-05 PROCEDURE — 63710000001 ACETAMINOPHEN 325 MG TABLET: Performed by: INTERNAL MEDICINE

## 2023-10-05 PROCEDURE — 96415 CHEMO IV INFUSION ADDL HR: CPT

## 2023-10-05 PROCEDURE — 25010000002 RITUXIMAB 10 MG/ML SOLUTION 50 ML VIAL: Performed by: INTERNAL MEDICINE

## 2023-10-05 PROCEDURE — A9270 NON-COVERED ITEM OR SERVICE: HCPCS | Performed by: INTERNAL MEDICINE

## 2023-10-05 PROCEDURE — 96413 CHEMO IV INFUSION 1 HR: CPT

## 2023-10-05 PROCEDURE — 25810000003 SODIUM CHLORIDE 0.9 % SOLUTION 250 ML FLEX CONT: Performed by: INTERNAL MEDICINE

## 2023-10-05 PROCEDURE — 25010000002 METHYLPREDNISOLONE PER 125 MG: Performed by: INTERNAL MEDICINE

## 2023-10-05 PROCEDURE — 63710000001 CETIRIZINE 10 MG TABLET: Performed by: INTERNAL MEDICINE

## 2023-10-05 RX ORDER — METHYLPREDNISOLONE SODIUM SUCCINATE 125 MG/2ML
125 INJECTION, POWDER, LYOPHILIZED, FOR SOLUTION INTRAMUSCULAR; INTRAVENOUS ONCE
Start: 2023-10-19 | End: 2023-10-19

## 2023-10-05 RX ORDER — ACETAMINOPHEN 325 MG/1
650 TABLET ORAL ONCE
Start: 2023-10-19 | End: 2023-10-19

## 2023-10-05 RX ORDER — MEPERIDINE HYDROCHLORIDE 50 MG/ML
25 INJECTION INTRAMUSCULAR; INTRAVENOUS; SUBCUTANEOUS
OUTPATIENT
Start: 2023-10-19

## 2023-10-05 RX ORDER — ACETAMINOPHEN 325 MG/1
650 TABLET ORAL ONCE
Status: COMPLETED | OUTPATIENT
Start: 2023-10-05 | End: 2023-10-05

## 2023-10-05 RX ORDER — CETIRIZINE HYDROCHLORIDE 10 MG/1
10 TABLET ORAL ONCE
Status: COMPLETED | OUTPATIENT
Start: 2023-10-05 | End: 2023-10-05

## 2023-10-05 RX ORDER — METHYLPREDNISOLONE SODIUM SUCCINATE 125 MG/2ML
125 INJECTION, POWDER, LYOPHILIZED, FOR SOLUTION INTRAMUSCULAR; INTRAVENOUS ONCE
Status: COMPLETED | OUTPATIENT
Start: 2023-10-05 | End: 2023-10-05

## 2023-10-05 RX ORDER — DIPHENHYDRAMINE HYDROCHLORIDE 50 MG/ML
50 INJECTION INTRAMUSCULAR; INTRAVENOUS AS NEEDED
OUTPATIENT
Start: 2023-10-19

## 2023-10-05 RX ORDER — CETIRIZINE HYDROCHLORIDE 10 MG/1
10 TABLET ORAL ONCE
Start: 2023-10-19 | End: 2023-10-19

## 2023-10-05 RX ORDER — FAMOTIDINE 10 MG/ML
20 INJECTION, SOLUTION INTRAVENOUS AS NEEDED
OUTPATIENT
Start: 2023-10-19

## 2023-10-05 RX ORDER — ACETAMINOPHEN 325 MG/1
650 TABLET ORAL ONCE
OUTPATIENT
Start: 2023-10-19

## 2023-10-05 RX ADMIN — ACETAMINOPHEN 650 MG: 325 TABLET ORAL at 08:27

## 2023-10-05 RX ADMIN — METHYLPREDNISOLONE SODIUM SUCCINATE 125 MG: 125 INJECTION, POWDER, FOR SOLUTION INTRAMUSCULAR; INTRAVENOUS at 08:28

## 2023-10-05 RX ADMIN — RITUXIMAB 1000 MG: 10 INJECTION, SOLUTION INTRAVENOUS at 08:59

## 2023-10-05 RX ADMIN — CETIRIZINE HYDROCHLORIDE 10 MG: 10 TABLET, FILM COATED ORAL at 08:27

## 2023-10-16 RX ORDER — SODIUM PICOSULFATE, MAGNESIUM OXIDE, AND ANHYDROUS CITRIC ACID 10; 3.5; 12 MG/160ML; G/160ML; G/160ML
1 LIQUID ORAL TAKE AS DIRECTED
Qty: 160 ML | Refills: 0 | Status: SHIPPED | OUTPATIENT
Start: 2023-10-16

## 2023-10-19 ENCOUNTER — HOSPITAL ENCOUNTER (OUTPATIENT)
Dept: ONCOLOGY | Facility: HOSPITAL | Age: 73
Discharge: HOME | End: 2023-10-19
Admitting: INTERNAL MEDICINE
Payer: MEDICARE

## 2023-10-19 VITALS
TEMPERATURE: 98.1 F | SYSTOLIC BLOOD PRESSURE: 108 MMHG | WEIGHT: 186 LBS | HEART RATE: 73 BPM | HEIGHT: 65 IN | BODY MASS INDEX: 30.99 KG/M2 | RESPIRATION RATE: 16 BRPM | DIASTOLIC BLOOD PRESSURE: 44 MMHG

## 2023-10-19 DIAGNOSIS — M33.20 POLYMYOSITIS: Primary | ICD-10-CM

## 2023-10-19 PROCEDURE — 25810000003 SODIUM CHLORIDE 0.9 % SOLUTION 250 ML FLEX CONT: Performed by: INTERNAL MEDICINE

## 2023-10-19 PROCEDURE — 96415 CHEMO IV INFUSION ADDL HR: CPT

## 2023-10-19 PROCEDURE — 25010000002 METHYLPREDNISOLONE PER 125 MG: Performed by: INTERNAL MEDICINE

## 2023-10-19 PROCEDURE — A9270 NON-COVERED ITEM OR SERVICE: HCPCS | Performed by: INTERNAL MEDICINE

## 2023-10-19 PROCEDURE — 96375 TX/PRO/DX INJ NEW DRUG ADDON: CPT

## 2023-10-19 PROCEDURE — 96366 THER/PROPH/DIAG IV INF ADDON: CPT

## 2023-10-19 PROCEDURE — 63710000001 CETIRIZINE 10 MG TABLET: Performed by: INTERNAL MEDICINE

## 2023-10-19 PROCEDURE — 63710000001 ACETAMINOPHEN 325 MG TABLET: Performed by: INTERNAL MEDICINE

## 2023-10-19 PROCEDURE — 25010000002 RITUXIMAB 10 MG/ML SOLUTION 50 ML VIAL: Performed by: INTERNAL MEDICINE

## 2023-10-19 PROCEDURE — 96365 THER/PROPH/DIAG IV INF INIT: CPT

## 2023-10-19 PROCEDURE — 96413 CHEMO IV INFUSION 1 HR: CPT

## 2023-10-19 RX ORDER — METHYLPREDNISOLONE SODIUM SUCCINATE 125 MG/2ML
125 INJECTION, POWDER, LYOPHILIZED, FOR SOLUTION INTRAMUSCULAR; INTRAVENOUS ONCE
Start: 2024-04-16 | End: 2024-04-16

## 2023-10-19 RX ORDER — CETIRIZINE HYDROCHLORIDE 10 MG/1
10 TABLET ORAL ONCE
Status: CANCELLED
Start: 2024-04-16 | End: 2024-04-16

## 2023-10-19 RX ORDER — ACETAMINOPHEN 325 MG/1
650 TABLET ORAL ONCE
Status: CANCELLED | OUTPATIENT
Start: 2024-04-16

## 2023-10-19 RX ORDER — CETIRIZINE HYDROCHLORIDE 10 MG/1
10 TABLET ORAL ONCE
Start: 2024-04-16 | End: 2024-04-16

## 2023-10-19 RX ORDER — DIPHENHYDRAMINE HYDROCHLORIDE 50 MG/ML
50 INJECTION INTRAMUSCULAR; INTRAVENOUS AS NEEDED
OUTPATIENT
Start: 2024-04-16

## 2023-10-19 RX ORDER — ACETAMINOPHEN 325 MG/1
650 TABLET ORAL ONCE
Status: COMPLETED | OUTPATIENT
Start: 2023-10-19 | End: 2023-10-19

## 2023-10-19 RX ORDER — ACETAMINOPHEN 325 MG/1
650 TABLET ORAL ONCE
Start: 2024-04-16 | End: 2024-04-16

## 2023-10-19 RX ORDER — FAMOTIDINE 10 MG/ML
20 INJECTION, SOLUTION INTRAVENOUS AS NEEDED
OUTPATIENT
Start: 2024-04-16

## 2023-10-19 RX ORDER — CETIRIZINE HYDROCHLORIDE 10 MG/1
10 TABLET ORAL ONCE
Status: COMPLETED | OUTPATIENT
Start: 2023-10-19 | End: 2023-10-19

## 2023-10-19 RX ORDER — METHYLPREDNISOLONE SODIUM SUCCINATE 125 MG/2ML
125 INJECTION, POWDER, LYOPHILIZED, FOR SOLUTION INTRAMUSCULAR; INTRAVENOUS ONCE
Status: CANCELLED
Start: 2024-04-16 | End: 2024-04-16

## 2023-10-19 RX ORDER — MEPERIDINE HYDROCHLORIDE 50 MG/ML
25 INJECTION INTRAMUSCULAR; INTRAVENOUS; SUBCUTANEOUS
OUTPATIENT
Start: 2024-04-16

## 2023-10-19 RX ORDER — METHYLPREDNISOLONE SODIUM SUCCINATE 125 MG/2ML
125 INJECTION, POWDER, LYOPHILIZED, FOR SOLUTION INTRAMUSCULAR; INTRAVENOUS ONCE
Status: COMPLETED | OUTPATIENT
Start: 2023-10-19 | End: 2023-10-19

## 2023-10-19 RX ADMIN — ACETAMINOPHEN 650 MG: 325 TABLET ORAL at 08:25

## 2023-10-19 RX ADMIN — CETIRIZINE HYDROCHLORIDE 10 MG: 10 TABLET, FILM COATED ORAL at 08:25

## 2023-10-19 RX ADMIN — METHYLPREDNISOLONE SODIUM SUCCINATE 125 MG: 125 INJECTION, POWDER, FOR SOLUTION INTRAMUSCULAR; INTRAVENOUS at 08:26

## 2023-10-19 RX ADMIN — RITUXIMAB 1000 MG: 10 INJECTION, SOLUTION INTRAVENOUS at 08:57

## 2023-10-25 ENCOUNTER — OUTSIDE FACILITY SERVICE (OUTPATIENT)
Dept: GASTROENTEROLOGY | Facility: CLINIC | Age: 73
End: 2023-10-25
Payer: MEDICARE

## 2023-10-25 PROCEDURE — G0105 COLORECTAL SCRN; HI RISK IND: HCPCS | Performed by: INTERNAL MEDICINE

## 2023-10-26 ENCOUNTER — OFFICE VISIT (OUTPATIENT)
Dept: PULMONOLOGY | Facility: CLINIC | Age: 73
End: 2023-10-26
Payer: MEDICARE

## 2023-10-26 VITALS
WEIGHT: 186.44 LBS | TEMPERATURE: 97.1 F | RESPIRATION RATE: 16 BRPM | DIASTOLIC BLOOD PRESSURE: 70 MMHG | HEIGHT: 65 IN | SYSTOLIC BLOOD PRESSURE: 106 MMHG | HEART RATE: 71 BPM | BODY MASS INDEX: 31.06 KG/M2 | OXYGEN SATURATION: 98 %

## 2023-10-26 DIAGNOSIS — J84.9 ILD (INTERSTITIAL LUNG DISEASE): ICD-10-CM

## 2023-10-26 DIAGNOSIS — R91.8 GROUND GLASS OPACITY PRESENT ON IMAGING OF LUNG: Primary | ICD-10-CM

## 2023-10-26 DIAGNOSIS — Z87.891 FORMER SMOKER: ICD-10-CM

## 2023-10-26 DIAGNOSIS — Z86.16 HISTORY OF COVID-19: ICD-10-CM

## 2023-10-26 RX ORDER — GUAIFENESIN 600 MG/1
600 TABLET, EXTENDED RELEASE ORAL EVERY 12 HOURS
COMMUNITY

## 2023-10-26 NOTE — LETTER
October 26, 2023     Ghassan Hopper DO  [address]  [address]  [facility] [address]    Patient: Malou Raymond   YOB: 1950   Date of Visit: 10/26/2023     Dear Dr. Hopper, DO:    Thank you for referring Malou Raymond to me for evaluation. Below are the relevant portions of my assessment and plan of care.    If you have questions, please do not hesitate to call me. I look forward to following Malou along with you.         Sincerely,        Edwardo Drake MD        CC: No Recipients      Progress Notes:  No notes on file

## 2023-10-26 NOTE — PROGRESS NOTES
PULMONARY  NOTE    Chief Complaint     Polymyositis, autoimmune ILD, groundglass opacities, GERD, remote smoker, diastolic dysfunction, perennial rhinitis    History of Present Illness     73-year-old female returns today for follow-up  Last saw her 3/23/2023    She has polymyositis and is followed by Dr. Hopper  She had been on prednisone, mycophenolate, and Rituxan  She has been tapered off of the prednisone  She just received Rituxan infusions earlier this month  Overall she feels that she is doing fairly well/improved    Most recent CT scan of the chest is as noted below    She had COVID-19 last fall but has had no respiratory issues since I last saw her    Patient Active Problem List   Diagnosis    Polymyositis    Ground glass opacities on Chest CT    GERD    Remote smoker (None since 1993)    Diastolic dysfunction    Dyspnea on exertion    Gastic ulcer    History of COVID-19 (9/2022)    Autoimmune ILD (interstitial lung disease)      Allergies   Allergen Reactions    Ciprofloxacin Mental Status Change and Anxiety       Current Outpatient Medications:     azelastine (ASTELIN) 0.1 % nasal spray, 2 sprays into the nostril(s) as directed by provider 2 (Two) Times a Day. Use in each nostril as directed, Disp: , Rfl:     Bempedoic Acid-Ezetimibe (Nexlizet) 180-10 MG tablet, Take 1 tablet by mouth Daily., Disp: , Rfl:     Biotin 5000 MCG tablet, Take 1 tablet by mouth Daily., Disp: , Rfl:     calcium carb-cholecalciferol 600-800 MG-UNIT tablet, , Disp: , Rfl:     clarithromycin (BIAXIN) 500 MG tablet, , Disp: , Rfl:     Coenzyme Q-10 100 MG capsule, Take 1 capsule by mouth Daily., Disp: , Rfl:     desvenlafaxine (PRISTIQ) 50 MG 24 hr tablet, Take 1 tablet by mouth Daily., Disp: , Rfl:     fluticasone (FLONASE) 50 MCG/ACT nasal spray, 2 sprays into the nostril(s) as directed by provider Daily., Disp: , Rfl:     gabapentin (NEURONTIN) 300 MG capsule, 1 capsule Every Night., Disp: , Rfl:     guaiFENesin (Mucinex)  600 MG 12 hr tablet, Take 1 tablet by mouth Every 12 (Twelve) Hours., Disp: , Rfl:     levothyroxine (SYNTHROID, LEVOTHROID) 88 MCG tablet, Take 1 tablet by mouth Daily., Disp: , Rfl:     Multiple Vitamins-Minerals (CENTRUM SILVER 50+WOMEN) tablet, , Disp: , Rfl:     mycophenolate (CELLCEPT) 500 MG tablet, 3 tablets 2 (Two) Times a Day., Disp: , Rfl:     omeprazole (priLOSEC) 40 MG capsule, Take 1 capsule by mouth 2 (Two) Times a Day Before Meals. Take a half hour before breakfast, Disp: 60 capsule, Rfl: 11    potassium chloride (MICRO-K) 10 MEQ CR capsule, Take 1 capsule by mouth 2 (Two) Times a Day., Disp: , Rfl:     riTUXimab (Rituxan) 100 MG/10ML solution injection, Administer RITUXAN 1000mg IV week 0, 2, then every 6 months, Disp: , Rfl:     Sod Picosulfate-Mag Ox-Cit Acd (Clenpiq) 10-3.5-12 MG-GM -GM/160ML solution, Take 160 mL by mouth Take As Directed. Do Not Eat The Day Before Your Procedure, Call [phone] for questions., Disp: 160 mL, Rfl: 0    triamterene-hydrochlorothiazide (MAXZIDE-25) 37.5-25 MG per tablet, Take 1 tablet by mouth Daily., Disp: , Rfl:   MEDICATION LIST AND ALLERGIES REVIEWED.    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    Review of Systems  IF PRESENT REFER TO SCANNED ROS SHEET FROM SAME DATE  OTHERWISE ROS OBTAINED AND NON-CONTRIBUTORY OVER HPI.    /70 (BP Location: Right arm, Patient Position: Sitting, Cuff Size: Adult)   Pulse 71   Temp 97.1 °F (36.2 °C)   Resp 16   Ht 165.1 cm (65\")   Wt 84.6 kg (186 lb 7 oz)   SpO2 98% Comment: room air at rest  BMI 31.02 kg/m²   Physical Exam  Vitals and nursing note reviewed.   Constitutional:       General: She is not in acute distress.     Appearance: She is well-developed. She is not diaphoretic.   HENT:      Head: Normocephalic and atraumatic.   Neck:      Thyroid: No thyromegaly.   Cardiovascular:      Rate and Rhythm: Normal rate and regular rhythm.      Heart sounds: Normal heart sounds. No murmur heard.  Pulmonary:      Effort: Pulmonary effort is normal.      Breath sounds: Normal breath sounds. No stridor.   Lymphadenopathy:      Cervical: No cervical adenopathy.      Upper Body:      Right upper body: No supraclavicular or epitrochlear adenopathy.      Left upper body: No supraclavicular or epitrochlear adenopathy.   Skin:     General: Skin is warm and dry.   Neurological:      Mental Status: She is alert and oriented to person, place, and time.   Psychiatric:         Behavior: Behavior normal.         Results     CT scan of the chest from 5/1/2023 revealed stability in the interstitial lung disease but improved in comparison to November 2022    Immunization History   Administered Date(s) Administered    ABRYSVO 09/12/2023    COVID-19 (MODERNA) Monovalent Original Booster 10/20/2023    COVID-19 (PFIZER) BIVALENT 12+YRS 01/04/2023    COVID-19 (PFIZER) Purple Cap Monovalent 02/03/2021, 03/02/2021, 09/16/2021    Covid-19 (Pfizer) Gray Cap Monovalent 04/02/2022    Fluzone High Dose =>65 Years ([employer] ONLY) 09/14/2021    Fluzone High-Dose 65+yrs 09/12/2023    Hepatitis A 12/04/2019    INFLUENZA SPLIT TRI 09/16/2021     Problem List "       ICD-10-CM ICD-9-CM   1. Ground glass opacities on Chest CT  R91.8 793.19   2. History of COVID-19 (9/2022)  Z86.16 V12.09   3. Autoimmune ILD (interstitial lung disease)  J84.9 515   4. Remote smoker (None since 1993)  Z87.891 V15.82       Discussion     She is stable from pulmonary standpoint  No progression of symptoms  CT scans have been stable or improved  Serial PFTs have been difficult to interpret but at least are probably stable    She remains on her same medical regimen of Rituxan and mycophenolate    I will plan to see her back in 6 months with repeat PFTs and probably just get an annual CT scan of the chest at this point    Moderate level of Medical Decision Making complexity based on 2 or more chronic stable illnesses and an independent review of test results and/or prescription drug management.    Edwardo Drake MD  Note electronically signed    CC: Flory Delgado,

## 2023-10-30 ENCOUNTER — TRANSCRIBE ORDERS (OUTPATIENT)
Dept: ADMINISTRATIVE | Facility: HOSPITAL | Age: 73
End: 2023-10-30
Payer: MEDICARE

## 2023-10-30 DIAGNOSIS — Z78.0 POSTMENOPAUSAL: Primary | ICD-10-CM

## 2023-12-07 ENCOUNTER — HOSPITAL ENCOUNTER (OUTPATIENT)
Dept: BONE DENSITY | Facility: HOSPITAL | Age: 73
Discharge: HOME | End: 2023-12-07
Admitting: FAMILY MEDICINE
Payer: MEDICARE

## 2023-12-07 DIAGNOSIS — Z78.0 POSTMENOPAUSAL: ICD-10-CM

## 2023-12-07 PROCEDURE — 77080 DXA BONE DENSITY AXIAL: CPT

## 2024-06-10 PROBLEM — R76.8 ANA POSITIVE: Status: ACTIVE | Noted: 2024-06-10

## 2024-06-10 PROBLEM — M19.90 OSTEOARTHRITIS: Status: ACTIVE | Noted: 2024-06-10

## 2024-06-12 ENCOUNTER — OFFICE VISIT (OUTPATIENT)
Age: 74
End: 2024-06-12
Payer: MEDICARE

## 2024-06-12 VITALS
WEIGHT: 178.3 LBS | HEIGHT: 65 IN | HEART RATE: 64 BPM | SYSTOLIC BLOOD PRESSURE: 108 MMHG | TEMPERATURE: 97.5 F | DIASTOLIC BLOOD PRESSURE: 64 MMHG | BODY MASS INDEX: 29.71 KG/M2

## 2024-06-12 DIAGNOSIS — Z79.899 HIGH RISK MEDICATION USE: Chronic | ICD-10-CM

## 2024-06-12 DIAGNOSIS — M33.20 POLYMYOSITIS: Primary | Chronic | ICD-10-CM

## 2024-06-12 DIAGNOSIS — D84.821 IMMUNODEFICIENCY DUE TO DRUG THERAPY: Chronic | ICD-10-CM

## 2024-06-12 DIAGNOSIS — Z79.899 IMMUNODEFICIENCY DUE TO DRUG THERAPY: Chronic | ICD-10-CM

## 2024-06-12 DIAGNOSIS — M15.9 PRIMARY OSTEOARTHRITIS INVOLVING MULTIPLE JOINTS: Chronic | ICD-10-CM

## 2024-06-12 RX ORDER — MYCOPHENOLATE MOFETIL 500 MG/1
1500 TABLET ORAL 2 TIMES DAILY
Qty: 540 TABLET | Refills: 1 | Status: SHIPPED | OUTPATIENT
Start: 2024-06-12

## 2024-06-12 RX ORDER — ROPINIROLE 0.5 MG/1
TABLET, FILM COATED ORAL
COMMUNITY
Start: 2024-05-16

## 2024-06-12 NOTE — ASSESSMENT & PLAN NOTE
* IV Rituximab every  6 months for polymyositis  * 1st doses given 3/30/21 & 4/16/21  * 2nd doses given 10/27/21 & 11/10/21   * 3rd doses given 5/10/22 & 5/25/22  4th doses given 10/5/23 and 10/19/23   Hold if the patient develops infection.   Avoid live vaccines while on this medication.   No recent serious infections  No infusion reactions  Also hold this medication perioperatively if the patient is going to have a surgical procedure

## 2024-06-12 NOTE — ASSESSMENT & PLAN NOTE
1. Tylenol PRN as directed is fine.  2. She has tried NSAIDs like Celebrex PRN  3. She also has tried taking glucosamine supplements  4. She has done some physical therapy

## 2024-06-12 NOTE — PROGRESS NOTES
Office Follow Up      Date: 06/12/2024   Patient Name: Malou Raymond  MRN: [id number]  YOB: 1950    Referring Physician: No ref. provider found     Chief Complaint   Patient presents with    Osteoarthritis     Follow up     Polymyositis     Follow up       History of Present Illness: Malou Raymond is a 74 y.o. female who is here today for follow up.     She established care here at the [facility] as of 7/2/20. She is Narcisa-1 and Ro-52 positive. She is DANTE positive. We initially prescribed her MTX/folic acid and prednisone. MTX was stopped due to ILD and CellCept was started as an alternative.     She is not on steroids currently.     She received her initial Rituximab doses on 3/30/21 and 4/19/21. Her most recent doses of Rituximab were 10/5/23 & 10/19/23.  No infusion reactions. She cancelled her Spring 2024 dosing because she was concerned about the prior authorization/cost.     CT scan of the chest done 7/8/20 showed ground glass opacities. 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 She has seen pulmonology.    Today she rates her pain as 0.5/10 in severity. She has 1 minute/day of morning stiffness. No red or hot joints. No muscle pain or weakness. No back or neck problems.       No rash. No hair loss. No headaches or paresthesias. No lymphadenopathy. No abnormal bruising/bleeding. No GI or  issues. No shortness of breath. No chest pain.  No sicca symptoms. No difficulty swallowing. She has night sweats.       Subjective     Review of Systems   Constitutional:  Positive for diaphoresis.   HENT: Negative.     Eyes: Negative.    Respiratory: Negative.     Cardiovascular: Negative.    Gastrointestinal: Negative.    Endocrine: Negative.    Genitourinary: Negative.    Musculoskeletal:  Positive for arthralgias.   Skin: Negative.    Allergic/Immunologic: Negative.    Neurological: Negative.    Hematological: Negative.     Psychiatric/Behavioral: Negative.     All other systems reviewed and are negative.         Current Outpatient Medications:     Bempedoic Acid-Ezetimibe (Nexlizet) 180-10 MG tablet, Take 1 tablet by mouth Daily., Disp: , Rfl:     Biotin 5000 MCG tablet, Take 1 tablet by mouth Daily., Disp: , Rfl:     calcium carb-cholecalciferol 600-800 MG-UNIT tablet, , Disp: , Rfl:     Coenzyme Q-10 100 MG capsule, Take 1 capsule by mouth Daily., Disp: , Rfl:     desvenlafaxine (PRISTIQ) 50 MG 24 hr tablet, Take 1 tablet by mouth Daily., Disp: , Rfl:     glucosamine-chondroitin 500-400 MG capsule capsule, Take 1 capsule by mouth Daily., Disp: , Rfl:     levothyroxine (SYNTHROID, LEVOTHROID) 88 MCG tablet, Take 1 tablet by mouth Daily., Disp: , Rfl:     Multiple Vitamins-Minerals (CENTRUM SILVER 50+WOMEN) tablet, , Disp: , Rfl:     mycophenolate (CELLCEPT) 500 MG tablet, Take 3 tablets by mouth 2 (Two) Times a Day., Disp: 540 tablet, Rfl: 1    omeprazole (priLOSEC) 40 MG capsule, Take 1 capsule by mouth 2 (Two) Times a Day Before Meals. Take a half hour before breakfast, Disp: 60 capsule, Rfl: 11    potassium chloride (MICRO-K) 10 MEQ CR capsule, Take 1 capsule by mouth 2 (Two) Times a Day., Disp: , Rfl:     riTUXimab (Rituxan) 100 MG/10ML solution injection, Administer RITUXAN 1000mg IV week 0, 2, then every 6 months, Disp: , Rfl:     rOPINIRole (REQUIP) 0.5 MG tablet, , Disp: , Rfl:     triamterene-hydrochlorothiazide (MAXZIDE-25) 37.5-25 MG per tablet, Take 1 tablet by mouth Daily., Disp: , Rfl:     Allergies   Allergen Reactions    Ciprofloxacin Mental Status Change and Anxiety       I have reviewed and updated the patient's chief complaint, history of present illness, review of systems, past medical history, surgical history, family history, social history, medications and allergy list as appropriate.     Objective      Vitals:    06/12/24 1151   BP: 108/64   BP Location: Left arm   Patient Position: Sitting   Cuff Size:  "Adult   Pulse: 64   Temp: 97.5 °F (36.4 °C)   Weight: 80.9 kg (178 lb 4.8 oz)   Height: 165.1 cm (65\")   PainSc:   1   PainLoc: Shoulder     Body mass index is 29.67 kg/m².      Physical Exam     General: Well appearing 74 year old  female. Not in distress. She is ambulating unassisted.   SKIN: No rashes. No alopecia. No subcutaneous nodules. No digital pits or ulcers. No sclerodactyly.   HEENT: NCAT. Conjunctiva clear, no photophobia. No oral or nasal ulcers. Hearing intact.    Pulmonary: Clear to auscultation bilaterally. No wheezing, rales, or rhonchi.  CV: Regular rate and rhythm. No murmurs, rubs, or gallops.   Psych: Normal mood and affect. Alert and oriented x 3.   Extremities: No cyanosis or edema.   Musculoskeletal: No acute swelling or tenderness to palpation. . No warmth or erythema. Normal range of motion of the wrists, ankles, elbows, and knees.   Lymph: No palpable cervical adenopathy  Neuro: 5/5 strength in the proximal upper extremity muscles symmetrically. 5/5 strength proximally and distally in the lower extremities.        Procedures    Assessment / Plan      Assessment & Plan  Polymyositis  * Medications/treatments/interventions tried include: Tylenol, gabapentin, glucosamine/chondroitin, Desvenlafaxine, Celebrex, prednisone, Cortisone injections, MTX/folic acid, Rituximab, physical therapy   * 6/17/20: CBC normal, BMP normal, CPK was 583 (), RF negative, Uric acid 5.7, ESR 7.0, CRP normal, Aldolase was 24.1 (<10.3)  * 5/19/20: B12 normal, Folate normal, Lyme negative, RF negative, COVID negative, DANTE direct positive, Narcisa 1 positive, DS DNA negative, RNP normal, Treviño normal, SCL 70 normal, SSA and SSB normal, Chromatin normal, Centromere normal  * She established care here at the [facility] as of 7/2/20. She is Narcisa-1 and Ro-52 positive. She is DANTE positive. We have prescribed her MTX/folic acid and prednisone 20 mg/day. No recent injuries or infections. No fevers. " CT scan of the chest done 7/8/20 showed ground glass opacities. This is believed to be secondary to her autoimmune disease. She is now seeing pulmonology. CT scan of the abd/pelvis showed a left adnexa lesion. We asked her to see her gynecologist regarding this issue.    1. She has been found to have a ground glass opacities as of 7/8/20. Most likely she has ILD due to her polymyositis. She has seen pulmonology   2. MTX was discontinued due to her pulmonary issues. She is now on CellCept currently 1500 mg PO BID. This is well tolerated.  3. She should ensure she is up to date with age appropriate cancer screening.   4. Check labs every 8-12 weeks to assess disease activity and monitor for medication toxicity.    5. Continue Rituximab. She had her last round 10/2023. We will plan/scheduled her next dosing. Risks and benefits reviewed. She cancelled her Spring 2024 dosing because she was concerned about the prior authorization/cost. We will try and get this rescheduled.   6. Follow up with us in 3-4 months.   7. Refill medications  8. Her prognosis is fair  9. She is no longer on steroids.    Immunodeficiency due to drug therapy  * IV Rituximab every  6 months for polymyositis  * 1st doses given 3/30/21 & 4/16/21  * 2nd doses given 10/27/21 & 11/10/21   * 3rd doses given 5/10/22 & 5/25/22  4th doses given 10/5/23 and 10/19/23   Hold if the patient develops infection.   Avoid live vaccines while on this medication.   No recent serious infections  No infusion reactions  Also hold this medication perioperatively if the patient is going to have a surgical procedure    High risk medication use  * CellCept 1500 mg PO BID for polymyositis   1. CBC and CMP every 8-12 weeks to monitor for medication toxicity.   2. No recent serious infections.  3. Refill today  Primary osteoarthritis involving multiple joints  1. Tylenol PRN as directed is fine.  2. She has tried NSAIDs like Celebrex PRN  3. She also has tried taking  glucosamine supplements  4. She has done some physical therapy     Orders Placed This Encounter   Procedures    CBC Auto Differential    Comprehensive Metabolic Panel    C-reactive Protein    Sedimentation Rate    CK     New Medications Ordered This Visit   Medications    mycophenolate (CELLCEPT) 500 MG tablet     Sig: Take 3 tablets by mouth 2 (Two) Times a Day.     Dispense:  540 tablet     Refill:  1         Follow Up:   Return in about 4 months (around 10/12/2024).      Ghassan Hopper DO  [facility] [place]

## 2024-06-12 NOTE — ASSESSMENT & PLAN NOTE
* CellCept 1500 mg PO BID for polymyositis   1. CBC and CMP every 8-12 weeks to monitor for medication toxicity.   2. No recent serious infections.  3. Refill today

## 2024-06-12 NOTE — ASSESSMENT & PLAN NOTE
* Medications/treatments/interventions tried include: Tylenol, gabapentin, glucosamine/chondroitin, Desvenlafaxine, Celebrex, prednisone, Cortisone injections, MTX/folic acid, Rituximab, physical therapy   * 6/17/20: CBC normal, BMP normal, CPK was 583 (), RF negative, Uric acid 5.7, ESR 7.0, CRP normal, Aldolase was 24.1 (<10.3)  * 5/19/20: B12 normal, Folate normal, Lyme negative, RF negative, COVID negative, DANTE direct positive, Narcisa 1 positive, DS DNA negative, RNP normal, Treviño normal, SCL 70 normal, SSA and SSB normal, Chromatin normal, Centromere normal  * She established care here at the [facility] as of 7/2/20. She is Narcisa-1 and Ro-52 positive. She is DANTE positive. We have prescribed her MTX/folic acid and prednisone 20 mg/day. No recent injuries or infections. No fevers. CT scan of the chest done 7/8/20 showed ground glass opacities. This is believed to be secondary to her autoimmune disease. She is now seeing pulmonology. CT scan of the abd/pelvis showed a left adnexa lesion. We asked her to see her gynecologist regarding this issue.    1. She has been found to have a ground glass opacities as of 7/8/20. Most likely she has ILD due to her polymyositis. She has seen pulmonology   2. MTX was discontinued due to her pulmonary issues. She is now on CellCept currently 1500 mg PO BID. This is well tolerated.  3. She should ensure she is up to date with age appropriate cancer screening.   4. Check labs every 8-12 weeks to assess disease activity and monitor for medication toxicity.    5. Continue Rituximab. She had her last round 10/2023. We will plan/scheduled her next dosing. Risks and benefits reviewed. She cancelled her Spring 2024 dosing because she was concerned about the prior authorization/cost. We will try and get this rescheduled.   6. Follow up with us in 3-4 months.   7. Refill medications  8. Her prognosis is fair  9. She is no longer on steroids.

## 2024-06-14 ENCOUNTER — TELEPHONE (OUTPATIENT)
Age: 74
End: 2024-06-14
Payer: MEDICARE

## 2024-06-14 NOTE — TELEPHONE ENCOUNTER
Called Lily Gannon and got patient scheduled for her Rituxan on 06/28/24 at 7:30am. I called patient and left her a message with appointment information and told her she should be arriving 10-15 minutes early on treatment day for registration.

## 2024-06-14 NOTE — TELEPHONE ENCOUNTER
Sent a message to [facility] dept regarding getting this pt re-authorized. Added her to list to be scheduled.

## 2024-06-28 ENCOUNTER — HOSPITAL ENCOUNTER (OUTPATIENT)
Dept: ONCOLOGY | Facility: HOSPITAL | Age: 74
Discharge: HOME | End: 2024-06-28
Admitting: INTERNAL MEDICINE
Payer: MEDICARE

## 2024-06-28 VITALS
SYSTOLIC BLOOD PRESSURE: 121 MMHG | DIASTOLIC BLOOD PRESSURE: 63 MMHG | RESPIRATION RATE: 20 BRPM | HEART RATE: 67 BPM | BODY MASS INDEX: 29.12 KG/M2 | TEMPERATURE: 99 F | WEIGHT: 175 LBS

## 2024-06-28 DIAGNOSIS — M33.20 POLYMYOSITIS: Primary | ICD-10-CM

## 2024-06-28 PROCEDURE — 25810000003 SODIUM CHLORIDE 0.9 % SOLUTION 250 ML FLEX CONT: Performed by: INTERNAL MEDICINE

## 2024-06-28 PROCEDURE — 96415 CHEMO IV INFUSION ADDL HR: CPT

## 2024-06-28 PROCEDURE — 63710000001 CETIRIZINE 10 MG TABLET: Performed by: INTERNAL MEDICINE

## 2024-06-28 PROCEDURE — 96374 THER/PROPH/DIAG INJ IV PUSH: CPT

## 2024-06-28 PROCEDURE — 25010000002 METHYLPREDNISOLONE PER 125 MG: Performed by: INTERNAL MEDICINE

## 2024-06-28 PROCEDURE — 25010000002 RITUXIMAB 10 MG/ML SOLUTION 50 ML VIAL: Performed by: INTERNAL MEDICINE

## 2024-06-28 PROCEDURE — 96365 THER/PROPH/DIAG IV INF INIT: CPT

## 2024-06-28 PROCEDURE — A9270 NON-COVERED ITEM OR SERVICE: HCPCS | Performed by: INTERNAL MEDICINE

## 2024-06-28 PROCEDURE — 96413 CHEMO IV INFUSION 1 HR: CPT

## 2024-06-28 PROCEDURE — 96375 TX/PRO/DX INJ NEW DRUG ADDON: CPT

## 2024-06-28 PROCEDURE — 63710000001 ACETAMINOPHEN 325 MG TABLET: Performed by: INTERNAL MEDICINE

## 2024-06-28 PROCEDURE — 96366 THER/PROPH/DIAG IV INF ADDON: CPT

## 2024-06-28 RX ORDER — MEPERIDINE HYDROCHLORIDE 50 MG/ML
25 INJECTION INTRAMUSCULAR; INTRAVENOUS; SUBCUTANEOUS
OUTPATIENT
Start: 2024-06-28

## 2024-06-28 RX ORDER — METHYLPREDNISOLONE SODIUM SUCCINATE 125 MG/2ML
125 INJECTION, POWDER, LYOPHILIZED, FOR SOLUTION INTRAMUSCULAR; INTRAVENOUS ONCE
Status: CANCELLED
Start: 2024-06-28 | End: 2024-06-28

## 2024-06-28 RX ORDER — METHYLPREDNISOLONE SODIUM SUCCINATE 125 MG/2ML
125 INJECTION, POWDER, LYOPHILIZED, FOR SOLUTION INTRAMUSCULAR; INTRAVENOUS ONCE
Start: 2024-06-28 | End: 2024-06-28

## 2024-06-28 RX ORDER — CETIRIZINE HYDROCHLORIDE 10 MG/1
10 TABLET ORAL ONCE
Status: COMPLETED | OUTPATIENT
Start: 2024-06-28 | End: 2024-06-28

## 2024-06-28 RX ORDER — ACETAMINOPHEN 325 MG/1
650 TABLET ORAL ONCE
Status: COMPLETED | OUTPATIENT
Start: 2024-06-28 | End: 2024-06-28

## 2024-06-28 RX ORDER — FAMOTIDINE 10 MG/ML
20 INJECTION, SOLUTION INTRAVENOUS AS NEEDED
OUTPATIENT
Start: 2024-06-28

## 2024-06-28 RX ORDER — DIPHENHYDRAMINE HYDROCHLORIDE 50 MG/ML
50 INJECTION INTRAMUSCULAR; INTRAVENOUS AS NEEDED
OUTPATIENT
Start: 2024-06-28

## 2024-06-28 RX ORDER — ACETAMINOPHEN 325 MG/1
650 TABLET ORAL ONCE
Status: CANCELLED
Start: 2024-06-28 | End: 2024-06-28

## 2024-06-28 RX ORDER — CETIRIZINE HYDROCHLORIDE 10 MG/1
10 TABLET ORAL ONCE
Status: CANCELLED
Start: 2024-06-28 | End: 2024-06-28

## 2024-06-28 RX ORDER — METHYLPREDNISOLONE SODIUM SUCCINATE 125 MG/2ML
125 INJECTION, POWDER, LYOPHILIZED, FOR SOLUTION INTRAMUSCULAR; INTRAVENOUS ONCE
Status: COMPLETED | OUTPATIENT
Start: 2024-06-28 | End: 2024-06-28

## 2024-06-28 RX ADMIN — CETIRIZINE HYDROCHLORIDE 10 MG: 10 TABLET, FILM COATED ORAL at 07:53

## 2024-06-28 RX ADMIN — RITUXIMAB 1000 MG: 10 INJECTION, SOLUTION INTRAVENOUS at 08:28

## 2024-06-28 RX ADMIN — METHYLPREDNISOLONE SODIUM SUCCINATE 125 MG: 125 INJECTION, POWDER, FOR SOLUTION INTRAMUSCULAR; INTRAVENOUS at 08:04

## 2024-06-28 RX ADMIN — ACETAMINOPHEN 650 MG: 325 TABLET ORAL at 07:54

## 2024-07-15 ENCOUNTER — HOSPITAL ENCOUNTER (OUTPATIENT)
Dept: ONCOLOGY | Facility: HOSPITAL | Age: 74
Discharge: HOME | End: 2024-07-15
Admitting: INTERNAL MEDICINE
Payer: MEDICARE

## 2024-07-15 VITALS
DIASTOLIC BLOOD PRESSURE: 60 MMHG | TEMPERATURE: 98.3 F | HEIGHT: 65 IN | HEART RATE: 67 BPM | SYSTOLIC BLOOD PRESSURE: 109 MMHG | WEIGHT: 175 LBS | BODY MASS INDEX: 29.16 KG/M2 | RESPIRATION RATE: 16 BRPM

## 2024-07-15 DIAGNOSIS — M33.20 POLYMYOSITIS: Primary | ICD-10-CM

## 2024-07-15 PROCEDURE — 63710000001 ACETAMINOPHEN 325 MG TABLET: Performed by: INTERNAL MEDICINE

## 2024-07-15 PROCEDURE — A9270 NON-COVERED ITEM OR SERVICE: HCPCS | Performed by: INTERNAL MEDICINE

## 2024-07-15 PROCEDURE — 96375 TX/PRO/DX INJ NEW DRUG ADDON: CPT

## 2024-07-15 PROCEDURE — 96413 CHEMO IV INFUSION 1 HR: CPT

## 2024-07-15 PROCEDURE — 25010000002 RITUXIMAB 10 MG/ML SOLUTION 50 ML VIAL: Performed by: INTERNAL MEDICINE

## 2024-07-15 PROCEDURE — 63710000001 CETIRIZINE 10 MG TABLET: Performed by: INTERNAL MEDICINE

## 2024-07-15 PROCEDURE — 25010000002 METHYLPREDNISOLONE PER 125 MG: Performed by: INTERNAL MEDICINE

## 2024-07-15 PROCEDURE — 25810000003 SODIUM CHLORIDE 0.9 % SOLUTION 250 ML FLEX CONT: Performed by: INTERNAL MEDICINE

## 2024-07-15 PROCEDURE — 96415 CHEMO IV INFUSION ADDL HR: CPT

## 2024-07-15 RX ORDER — FAMOTIDINE 10 MG/ML
20 INJECTION, SOLUTION INTRAVENOUS AS NEEDED
OUTPATIENT
Start: 2024-07-15

## 2024-07-15 RX ORDER — METHYLPREDNISOLONE SODIUM SUCCINATE 125 MG/2ML
125 INJECTION, POWDER, LYOPHILIZED, FOR SOLUTION INTRAMUSCULAR; INTRAVENOUS ONCE
Start: 2024-07-15 | End: 2024-07-15

## 2024-07-15 RX ORDER — METHYLPREDNISOLONE SODIUM SUCCINATE 125 MG/2ML
125 INJECTION, POWDER, LYOPHILIZED, FOR SOLUTION INTRAMUSCULAR; INTRAVENOUS ONCE
Status: CANCELLED
Start: 2024-07-15 | End: 2024-07-15

## 2024-07-15 RX ORDER — ACETAMINOPHEN 325 MG/1
650 TABLET ORAL ONCE
Status: COMPLETED | OUTPATIENT
Start: 2024-07-15 | End: 2024-07-15

## 2024-07-15 RX ORDER — DIPHENHYDRAMINE HYDROCHLORIDE 50 MG/ML
50 INJECTION INTRAMUSCULAR; INTRAVENOUS AS NEEDED
OUTPATIENT
Start: 2024-07-15

## 2024-07-15 RX ORDER — METHYLPREDNISOLONE SODIUM SUCCINATE 125 MG/2ML
125 INJECTION, POWDER, LYOPHILIZED, FOR SOLUTION INTRAMUSCULAR; INTRAVENOUS ONCE
Status: COMPLETED | OUTPATIENT
Start: 2024-07-15 | End: 2024-07-15

## 2024-07-15 RX ORDER — ACETAMINOPHEN 325 MG/1
650 TABLET ORAL ONCE
Status: CANCELLED
Start: 2024-07-15 | End: 2024-07-15

## 2024-07-15 RX ORDER — CETIRIZINE HYDROCHLORIDE 10 MG/1
10 TABLET ORAL DAILY
Status: CANCELLED
Start: 2024-07-15

## 2024-07-15 RX ORDER — CETIRIZINE HYDROCHLORIDE 10 MG/1
10 TABLET ORAL DAILY
Status: DISCONTINUED | OUTPATIENT
Start: 2024-07-15 | End: 2024-07-16 | Stop reason: HOSPADM

## 2024-07-15 RX ORDER — MEPERIDINE HYDROCHLORIDE 50 MG/ML
25 INJECTION INTRAMUSCULAR; INTRAVENOUS; SUBCUTANEOUS
OUTPATIENT
Start: 2024-07-15

## 2024-07-15 RX ADMIN — RITUXIMAB 1000 MG: 10 INJECTION, SOLUTION INTRAVENOUS at 09:45

## 2024-07-15 RX ADMIN — METHYLPREDNISOLONE SODIUM SUCCINATE 125 MG: 125 INJECTION, POWDER, FOR SOLUTION INTRAMUSCULAR; INTRAVENOUS at 09:11

## 2024-07-15 RX ADMIN — CETIRIZINE HYDROCHLORIDE 10 MG: 10 TABLET, FILM COATED ORAL at 09:12

## 2024-07-15 RX ADMIN — ACETAMINOPHEN 650 MG: 325 TABLET ORAL at 09:11

## 2024-07-17 RX ORDER — SODIUM CHLORIDE 9 MG/ML
20 INJECTION, SOLUTION INTRAVENOUS ONCE
OUTPATIENT
Start: 2025-01-15

## 2024-07-17 RX ORDER — FAMOTIDINE 10 MG/ML
20 INJECTION, SOLUTION INTRAVENOUS AS NEEDED
OUTPATIENT
Start: 2025-01-15

## 2024-07-17 RX ORDER — ACETAMINOPHEN 325 MG/1
650 TABLET ORAL ONCE
OUTPATIENT
Start: 2025-01-15

## 2024-07-17 RX ORDER — DIPHENHYDRAMINE HYDROCHLORIDE 50 MG/ML
50 INJECTION INTRAMUSCULAR; INTRAVENOUS AS NEEDED
OUTPATIENT
Start: 2025-01-15

## 2024-07-17 RX ORDER — LORATADINE 10 MG/1
10 TABLET ORAL ONCE
Start: 2025-01-15

## 2024-07-17 RX ORDER — METHYLPREDNISOLONE SODIUM SUCCINATE 125 MG/2ML
125 INJECTION, POWDER, LYOPHILIZED, FOR SOLUTION INTRAMUSCULAR; INTRAVENOUS ONCE
Start: 2025-01-15

## 2024-07-30 ENCOUNTER — OFFICE VISIT (OUTPATIENT)
Dept: PULMONOLOGY | Facility: CLINIC | Age: 74
End: 2024-07-30
Payer: MEDICARE

## 2024-07-30 VITALS
SYSTOLIC BLOOD PRESSURE: 134 MMHG | RESPIRATION RATE: 16 BRPM | OXYGEN SATURATION: 99 % | BODY MASS INDEX: 29.66 KG/M2 | HEIGHT: 65 IN | DIASTOLIC BLOOD PRESSURE: 76 MMHG | WEIGHT: 178 LBS | HEART RATE: 66 BPM | TEMPERATURE: 98 F

## 2024-07-30 DIAGNOSIS — J84.9 ILD (INTERSTITIAL LUNG DISEASE): Primary | ICD-10-CM

## 2024-07-30 DIAGNOSIS — Z86.16 HISTORY OF COVID-19: ICD-10-CM

## 2024-07-30 DIAGNOSIS — M33.20 POLYMYOSITIS: ICD-10-CM

## 2024-07-30 DIAGNOSIS — R91.8 GROUND GLASS OPACITY PRESENT ON IMAGING OF LUNG: ICD-10-CM

## 2024-07-30 PROCEDURE — 94729 DIFFUSING CAPACITY: CPT | Performed by: INTERNAL MEDICINE

## 2024-07-30 PROCEDURE — 99214 OFFICE O/P EST MOD 30 MIN: CPT | Performed by: INTERNAL MEDICINE

## 2024-07-30 PROCEDURE — 94726 PLETHYSMOGRAPHY LUNG VOLUMES: CPT | Performed by: INTERNAL MEDICINE

## 2024-07-30 PROCEDURE — 94010 BREATHING CAPACITY TEST: CPT | Performed by: INTERNAL MEDICINE

## 2024-07-30 PROCEDURE — 1160F RVW MEDS BY RX/DR IN RCRD: CPT | Performed by: INTERNAL MEDICINE

## 2024-07-30 PROCEDURE — 1159F MED LIST DOCD IN RCRD: CPT | Performed by: INTERNAL MEDICINE

## 2024-07-30 NOTE — PROGRESS NOTES
PULMONARY  NOTE    Chief Complaint     Polymyositis, autoimmune ILD, groundglass opacities, GERD, remote smoker, diastolic dysfunction, perennial rhinitis    History of Present Illness     74-year-old female returns today for follow-up  I last saw her in October 2023    She has polymyositis followed by Dr. Hopper  Previously on prednisone which she has been off of for a while  Remains on mycophenolate and Rituxan  Just got her most recent Rituxan infusion    No exacerbation of symptoms  No increased cough or sputum production  No dyspnea on exertion  No recent respiratory tract infections    Patient Active Problem List   Diagnosis    Polymyositis    Ground glass opacities on Chest CT    GERD    Remote smoker (None since 1993)    Diastolic dysfunction    Dyspnea on exertion    Gastic ulcer    History of COVID-19 (9/2022)    Autoimmune ILD (interstitial lung disease)    DANTE positive    Osteoarthritis    Immunodeficiency due to drug therapy    High risk medication use      Allergies   Allergen Reactions    Ciprofloxacin Mental Status Change and Anxiety       Current Outpatient Medications:     Bempedoic Acid-Ezetimibe (Nexlizet) 180-10 MG tablet, Take 1 tablet by mouth Daily., Disp: , Rfl:     Biotin 5000 MCG tablet, Take 1 tablet by mouth Daily., Disp: , Rfl:     calcium carb-cholecalciferol 600-800 MG-UNIT tablet, , Disp: , Rfl:     Coenzyme Q-10 100 MG capsule, Take 1 capsule by mouth Daily., Disp: , Rfl:     desvenlafaxine (PRISTIQ) 50 MG 24 hr tablet, Take 1 tablet by mouth Daily., Disp: , Rfl:     glucosamine-chondroitin 500-400 MG capsule capsule, Take 1 capsule by mouth Daily., Disp: , Rfl:     levothyroxine (SYNTHROID, LEVOTHROID) 88 MCG tablet, Take 1 tablet by mouth Daily., Disp: , Rfl:     Multiple Vitamins-Minerals (CENTRUM SILVER 50+WOMEN) tablet, , Disp: , Rfl:     mycophenolate (CELLCEPT) 500 MG tablet, Take 3 tablets by mouth 2 (Two) Times a Day., Disp: 540 tablet, Rfl: 1    omeprazole (priLOSEC)  "40 MG capsule, Take 1 capsule by mouth 2 (Two) Times a Day Before Meals. Take a half hour before breakfast, Disp: 60 capsule, Rfl: 11    potassium chloride (MICRO-K) 10 MEQ CR capsule, Take 1 capsule by mouth 2 (Two) Times a Day., Disp: , Rfl:     riTUXimab (Rituxan) 100 MG/10ML solution injection, Administer RITUXAN 1000mg IV week 0, 2, then every 6 months, Disp: , Rfl:     rOPINIRole (REQUIP) 0.5 MG tablet, , Disp: , Rfl:     triamterene-hydrochlorothiazide (MAXZIDE-25) 37.5-25 MG per tablet, Take 1 tablet by mouth Daily., Disp: , Rfl:   MEDICATION LIST AND ALLERGIES REVIEWED.    Family History   Problem Relation Age of Onset    Alzheimer's disease Mother     COPD Father     Heart failure Father     Diabetes Maternal Grandmother     Heart failure Maternal Grandfather      Social History     Tobacco Use    Smoking status: Former     Current packs/day: 0.00     Average packs/day: 2.0 packs/day for 15.0 years (30.0 ttl pk-yrs)     Types: Cigarettes     Start date: 1978     Quit date: 1993     Years since quittin.0     Passive exposure: Past    Smokeless tobacco: Never   Vaping Use    Vaping status: Never Used   Substance Use Topics    Alcohol use: Yes    Drug use: Never     Social History     Social History Narrative    Single    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    Review of Systems  IF PRESENT REFER TO SCANNED ROS SHEET FROM SAME DATE  OTHERWISE ROS OBTAINED AND NON-CONTRIBUTORY OVER HPI.    /76   Pulse 66   Temp 98 °F (36.7 °C)   Resp 16   Ht 165.1 cm (65\")   Wt 80.7 kg (178 lb)   SpO2 99% Comment: room air at resting  BMI 29.62 kg/m²   Physical Exam  Vitals and nursing note reviewed.   Constitutional:       General: She is not in acute distress.     " Appearance: She is well-developed. She is not diaphoretic.   HENT:      Head: Normocephalic and atraumatic.   Neck:      Thyroid: No thyromegaly.   Cardiovascular:      Rate and Rhythm: Normal rate and regular rhythm.      Heart sounds: Normal heart sounds. No murmur heard.  Pulmonary:      Effort: Pulmonary effort is normal.      Breath sounds: Normal breath sounds. No stridor.   Lymphadenopathy:      Cervical: No cervical adenopathy.      Upper Body:      Right upper body: No supraclavicular or epitrochlear adenopathy.      Left upper body: No supraclavicular or epitrochlear adenopathy.   Skin:     General: Skin is warm and dry.   Neurological:      Mental Status: She is alert and oriented to person, place, and time.   Psychiatric:         Behavior: Behavior normal.         Results     PFTs continue to exhibit a restrictive pattern but values are all stable or slightly improved in comparison to prior PFTs    Immunization History   Administered Date(s) Administered    ABRYSVO (RSV, 60+ or pregnant women 32-36 wks) 09/12/2023    COVID-19 (MODERNA) Monovalent Original Booster 10/20/2023    COVID-19 (PFIZER) BIVALENT 12+YRS 01/04/2023    COVID-19 (PFIZER) Purple Cap Monovalent 02/03/2021, 03/02/2021, 09/16/2021    Covid-19 (Pfizer) Gray Cap Monovalent 04/02/2022    Fluzone High Dose =>65 Years ([employer] ONLY) 09/14/2021    Fluzone High-Dose 65+yrs 09/12/2023    Hepatitis A 12/04/2019    INFLUENZA SPLIT TRI 09/16/2021    Influenza, Unspecified 09/29/2020     Problem List       ICD-10-CM ICD-9-CM   1. Autoimmune ILD (interstitial lung disease)  J84.9 515   2. Ground glass opacities on Chest CT  R91.8 793.19   3. History of COVID-19 (9/2022)  Z86.16 V12.09   4. Polymyositis  M33.20 710.4       Discussion     We reviewed her PFTs together  Lung volumes are stable  She has had no progression of respiratory symptoms    She is good to remain on her current medical regimen of mycophenolate and Rituxan    Will not add  antifibrotic therapy unless there is evidence of progressive pulmonary fibrosis    Plan to get a follow-up CT scan of the chest a little bit later on in the fall and will probably get scanning every 1-2 years and PFTs every 6 months for the time being    Moderate level of Medical Decision Making complexity based on 2 or more chronic stable illnesses and an independent review of test results and/or prescription drug management.    Edwardo Drake MD  Note electronically signed    CC: Flory Delgado, DO

## 2024-07-31 DIAGNOSIS — R91.8 GROUND GLASS OPACITY PRESENT ON IMAGING OF LUNG: Primary | ICD-10-CM

## 2024-07-31 DIAGNOSIS — J84.9 ILD (INTERSTITIAL LUNG DISEASE): ICD-10-CM

## 2024-10-10 ENCOUNTER — LAB (OUTPATIENT)
Dept: LAB | Facility: HOSPITAL | Age: 74
End: 2024-10-10
Payer: MEDICARE

## 2024-10-10 DIAGNOSIS — D84.821 IMMUNODEFICIENCY DUE TO DRUG THERAPY: Chronic | ICD-10-CM

## 2024-10-10 DIAGNOSIS — M15.0 PRIMARY OSTEOARTHRITIS INVOLVING MULTIPLE JOINTS: Chronic | ICD-10-CM

## 2024-10-10 DIAGNOSIS — Z79.899 IMMUNODEFICIENCY DUE TO DRUG THERAPY: Chronic | ICD-10-CM

## 2024-10-10 DIAGNOSIS — Z79.899 HIGH RISK MEDICATION USE: Chronic | ICD-10-CM

## 2024-10-10 DIAGNOSIS — M33.20 POLYMYOSITIS: Chronic | ICD-10-CM

## 2024-10-10 LAB
ALBUMIN SERPL-MCNC: 4.3 G/DL (ref 3.5–5.2)
ALBUMIN/GLOB SERPL: 1.9 G/DL
ALP SERPL-CCNC: 91 U/L (ref 39–117)
ALT SERPL W P-5'-P-CCNC: 17 U/L (ref 1–33)
ANION GAP SERPL CALCULATED.3IONS-SCNC: 10.7 MMOL/L (ref 5–15)
AST SERPL-CCNC: 22 U/L (ref 1–32)
BASOPHILS # BLD AUTO: 0.06 10*3/MM3 (ref 0–0.2)
BASOPHILS NFR BLD AUTO: 1 % (ref 0–1.5)
BILIRUB SERPL-MCNC: 0.5 MG/DL (ref 0–1.2)
BUN SERPL-MCNC: 20 MG/DL (ref 8–23)
BUN/CREAT SERPL: 23.8 (ref 7–25)
CALCIUM SPEC-SCNC: 10.2 MG/DL (ref 8.6–10.5)
CHLORIDE SERPL-SCNC: 103 MMOL/L (ref 98–107)
CK SERPL-CCNC: 74 U/L (ref 20–180)
CO2 SERPL-SCNC: 25.3 MMOL/L (ref 22–29)
CREAT SERPL-MCNC: 0.84 MG/DL (ref 0.57–1)
CRP SERPL-MCNC: <0.3 MG/DL (ref 0–0.5)
DEPRECATED RDW RBC AUTO: 42.6 FL (ref 37–54)
EGFRCR SERPLBLD CKD-EPI 2021: 73.5 ML/MIN/1.73
EOSINOPHIL # BLD AUTO: 0.1 10*3/MM3 (ref 0–0.4)
EOSINOPHIL NFR BLD AUTO: 1.7 % (ref 0.3–6.2)
ERYTHROCYTE [DISTWIDTH] IN BLOOD BY AUTOMATED COUNT: 13.2 % (ref 12.3–15.4)
ERYTHROCYTE [SEDIMENTATION RATE] IN BLOOD: <1 MM/HR (ref 0–30)
GLOBULIN UR ELPH-MCNC: 2.3 GM/DL
GLUCOSE SERPL-MCNC: 81 MG/DL (ref 65–99)
HCT VFR BLD AUTO: 39.6 % (ref 34–46.6)
HGB BLD-MCNC: 13 G/DL (ref 12–15.9)
IMM GRANULOCYTES # BLD AUTO: 0.01 10*3/MM3 (ref 0–0.05)
IMM GRANULOCYTES NFR BLD AUTO: 0.2 % (ref 0–0.5)
LYMPHOCYTES # BLD AUTO: 1.87 10*3/MM3 (ref 0.7–3.1)
LYMPHOCYTES NFR BLD AUTO: 31 % (ref 19.6–45.3)
MCH RBC QN AUTO: 29.3 PG (ref 26.6–33)
MCHC RBC AUTO-ENTMCNC: 32.8 G/DL (ref 31.5–35.7)
MCV RBC AUTO: 89.2 FL (ref 79–97)
MONOCYTES # BLD AUTO: 0.91 10*3/MM3 (ref 0.1–0.9)
MONOCYTES NFR BLD AUTO: 15.1 % (ref 5–12)
NEUTROPHILS NFR BLD AUTO: 3.09 10*3/MM3 (ref 1.7–7)
NEUTROPHILS NFR BLD AUTO: 51 % (ref 42.7–76)
NRBC BLD AUTO-RTO: 0 /100 WBC (ref 0–0.2)
PLATELET # BLD AUTO: 232 10*3/MM3 (ref 140–450)
PMV BLD AUTO: 11.9 FL (ref 6–12)
POTASSIUM SERPL-SCNC: 4.4 MMOL/L (ref 3.5–5.2)
PROT SERPL-MCNC: 6.6 G/DL (ref 6–8.5)
RBC # BLD AUTO: 4.44 10*6/MM3 (ref 3.77–5.28)
SODIUM SERPL-SCNC: 139 MMOL/L (ref 136–145)
WBC NRBC COR # BLD AUTO: 6.04 10*3/MM3 (ref 3.4–10.8)

## 2024-10-10 PROCEDURE — 80053 COMPREHEN METABOLIC PANEL: CPT

## 2024-10-10 PROCEDURE — 82550 ASSAY OF CK (CPK): CPT

## 2024-10-10 PROCEDURE — 85025 COMPLETE CBC W/AUTO DIFF WBC: CPT

## 2024-10-10 PROCEDURE — 36415 COLL VENOUS BLD VENIPUNCTURE: CPT

## 2024-10-10 PROCEDURE — 86140 C-REACTIVE PROTEIN: CPT

## 2024-10-10 PROCEDURE — 85652 RBC SED RATE AUTOMATED: CPT

## 2024-10-17 ENCOUNTER — TELEPHONE (OUTPATIENT)
Age: 74
End: 2024-10-17

## 2024-10-17 ENCOUNTER — OFFICE VISIT (OUTPATIENT)
Age: 74
End: 2024-10-17
Payer: MEDICARE

## 2024-10-17 VITALS
SYSTOLIC BLOOD PRESSURE: 120 MMHG | TEMPERATURE: 97.7 F | HEART RATE: 67 BPM | HEIGHT: 65 IN | BODY MASS INDEX: 29.2 KG/M2 | WEIGHT: 175.3 LBS | DIASTOLIC BLOOD PRESSURE: 70 MMHG

## 2024-10-17 DIAGNOSIS — M33.20 POLYMYOSITIS: Primary | Chronic | ICD-10-CM

## 2024-10-17 DIAGNOSIS — M15.0 PRIMARY OSTEOARTHRITIS INVOLVING MULTIPLE JOINTS: Chronic | ICD-10-CM

## 2024-10-17 DIAGNOSIS — Z79.899 HIGH RISK MEDICATION USE: Chronic | ICD-10-CM

## 2024-10-17 DIAGNOSIS — Z79.899 IMMUNODEFICIENCY DUE TO DRUG THERAPY: Chronic | ICD-10-CM

## 2024-10-17 DIAGNOSIS — D84.821 IMMUNODEFICIENCY DUE TO DRUG THERAPY: Chronic | ICD-10-CM

## 2024-10-17 RX ORDER — MYCOPHENOLATE MOFETIL 500 MG/1
1500 TABLET ORAL 2 TIMES DAILY
Qty: 540 TABLET | Refills: 1 | Status: SHIPPED | OUTPATIENT
Start: 2024-10-17

## 2024-10-17 NOTE — PROGRESS NOTES
Office Follow Up      Date: 10/17/2024   Patient Name: Malou Raymond  MRN: [id number]  YOB: 1950    Referring Physician: Flory Delgado DO     Chief Complaint   Patient presents with    Osteoarthritis     Follow up    Polymyositis     Follow up       History of Present Illness: Malou Raymond is a 74 y.o. female who is here today for follow up.     She established care here with us as of 7/2/20. She is Narcisa-1 and Ro-52 positive. She is DANTE positive. We initially prescribed her MTX/folic acid and prednisone. MTX was stopped due to ILD and CellCept was started as an alternative.     She is not on steroids currently.     She received her initial Rituximab doses on 3/30/21 and 4/19/21. Her most recent doses of Rituximab were 6/28/24 and 7/15/24  No infusion reactions.     CT scan of the chest done 7/8/20 showed ground glass opacities. 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 She has seen pulmonology.    Today she rates her pain as 0.5/10 in severity. She has 2 minutes/day of morning stiffness. No red or hot joints. No muscle pain or weakness. No back or neck problems. No joint swelling. Knees give out sometimes on her. She has had plantar fasciitis. Her right leg hurts at night.    No rash. No hair loss. No headaches or paresthesias. No lymphadenopathy. No abnormal bruising/bleeding. No GI or  issues. No shortness of breath. No chest pain.  No sicca symptoms. No difficulty swallowing. She gets nasal sores.       Subjective     Review of Systems   Constitutional: Negative.    HENT: Negative.     Eyes: Negative.    Respiratory: Negative.     Cardiovascular: Negative.    Gastrointestinal: Negative.    Endocrine: Negative.    Genitourinary: Negative.    Musculoskeletal:  Positive for arthralgias.   Skin: Negative.    Allergic/Immunologic: Negative.    Neurological: Negative.    Hematological: Negative.    Psychiatric/Behavioral: Negative.     All other systems  "reviewed and are negative.         Current Outpatient Medications:     Bempedoic Acid-Ezetimibe (Nexlizet) 180-10 MG tablet, Take 1 tablet by mouth Daily., Disp: , Rfl:     Biotin 5000 MCG tablet, Take 1 tablet by mouth Daily., Disp: , Rfl:     calcium carb-cholecalciferol 600-800 MG-UNIT tablet, , Disp: , Rfl:     Coenzyme Q-10 100 MG capsule, Take 1 capsule by mouth Daily., Disp: , Rfl:     desvenlafaxine (PRISTIQ) 50 MG 24 hr tablet, Take 1 tablet by mouth Daily., Disp: , Rfl:     glucosamine-chondroitin 500-400 MG capsule capsule, Take 1 capsule by mouth Daily., Disp: , Rfl:     levothyroxine (SYNTHROID, LEVOTHROID) 88 MCG tablet, Take 1 tablet by mouth Daily., Disp: , Rfl:     Multiple Vitamins-Minerals (CENTRUM SILVER 50+WOMEN) tablet, , Disp: , Rfl:     mycophenolate (CELLCEPT) 500 MG tablet, Take 3 tablets by mouth 2 (Two) Times a Day., Disp: 540 tablet, Rfl: 1    omeprazole (priLOSEC) 20 MG capsule, Take 1 capsule by mouth Daily., Disp: , Rfl:     potassium chloride (MICRO-K) 10 MEQ CR capsule, Take 1 capsule by mouth 2 (Two) Times a Day., Disp: , Rfl:     riTUXimab (Rituxan) 100 MG/10ML solution injection, Administer RITUXAN 1000mg IV week 0, 2, then every 6 months, Disp: , Rfl:     rOPINIRole (REQUIP) 0.5 MG tablet, , Disp: , Rfl:     triamterene-hydrochlorothiazide (MAXZIDE-25) 37.5-25 MG per tablet, Take 1 tablet by mouth Daily., Disp: , Rfl:     Allergies   Allergen Reactions    Ciprofloxacin Mental Status Change and Anxiety       I have reviewed and updated the patient's chief complaint, history of present illness, review of systems, past medical history, surgical history, family history, social history, medications and allergy list as appropriate.     Objective      Vitals:    10/17/24 1136   BP: 120/70   BP Location: Left arm   Patient Position: Sitting   Cuff Size: Adult   Pulse: 67   Temp: 97.7 °F (36.5 °C)   Weight: 79.5 kg (175 lb 4.8 oz)   Height: 165.1 cm (65\")   PainSc:   1       Body mass " index is 29.17 kg/m².      Physical Exam     General: Well appearing 74 year old  female. Not in distress. She is ambulating unassisted.   SKIN: No rashes. No alopecia. No subcutaneous nodules. No digital pits or ulcers. No sclerodactyly.   HEENT: NCAT. Conjunctiva clear, no photophobia. No oral or nasal ulcers. Hearing intact.    Pulmonary: Clear to auscultation bilaterally. No wheezing, rales, or rhonchi.  CV: Regular rate and rhythm. No murmurs, rubs, or gallops.   Psych: Normal mood and affect. Alert and oriented x 3.   Extremities: No cyanosis or edema.   Musculoskeletal: No acute swelling or tenderness to palpation. . No warmth or erythema. Normal range of motion of the wrists, ankles, elbows, and knees.   Lymph: No palpable cervical adenopathy  Neuro: 5/5 strength in the proximal upper extremity muscles symmetrically. 5/5 strength proximally and distally in the lower extremities.        Procedures    Assessment / Plan      Assessment & Plan  Polymyositis  * Medications/treatments/interventions tried include: Tylenol, gabapentin, glucosamine/chondroitin, Desvenlafaxine, Celebrex, prednisone, Cortisone injections, MTX/folic acid, Rituximab, physical therapy   * 6/17/20: CBC normal, BMP normal, CPK was 583 (), RF negative, Uric acid 5.7, ESR 7.0, CRP normal, Aldolase was 24.1 (<10.3)  * 5/19/20: B12 normal, Folate normal, Lyme negative, RF negative, COVID negative, DANTE direct positive, Narcisa 1 positive, DS DNA negative, RNP normal, Treviño normal, SCL 70 normal, SSA and SSB normal, Chromatin normal, Centromere normal  * She established care here at the [facility] as of 7/2/20. She is Narcisa-1 and Ro-52 positive. She is DANTE positive. We have prescribed her MTX/folic acid and prednisone 20 mg/day. No recent injuries or infections. No fevers. CT scan of the chest done 7/8/20 showed ground glass opacities. This is believed to be secondary to her autoimmune disease. She is now seeing  pulmonology. CT scan of the abd/pelvis showed a left adnexa lesion. We asked her to see her gynecologist regarding this issue.     1. She has been found to have a ground glass opacities as of 7/8/20. Most likely she has ILD due to her polymyositis. She has seen pulmonology   2. MTX was discontinued due to her pulmonary issues. She is now on CellCept currently 1500 mg PO BID. This is well tolerated.  3. She should ensure she is up to date with age appropriate cancer screening.   4. Check labs every 8-12 weeks to assess disease activity and monitor for medication toxicity.  Her most recent labs were done 10/10/24. No labs needed today. I will put lab orders in for future labs to be done.   5. Continue Rituximab. We will plan/scheduled her next dosing. Risks and benefits reviewed. She gets this every 6 months.   6. Follow up with us in 3-4 months.   7. Refill medications  8. Her prognosis is fair  9. She is no longer on steroids.  10. We gave her a handout on OA to take home and review.   Immunodeficiency due to drug therapy  * IV Rituximab every  6 months for polymyositis  * 1st doses given 3/30/21 & 4/16/21  * 2nd doses given 10/27/21 & 11/10/21   * 3rd doses given 5/10/22 & 5/25/22  4th doses given 10/5/23 and 10/19/23   5th doses given 6/28/24 and 7/15/24  Hold if the patient develops infection.   Avoid live vaccines while on this medication.   No recent serious infections  No infusion reactions  Also hold this medication perioperatively if the patient is going to have a surgical procedure  High risk medication use  * CellCept 1500 mg PO BID for polymyositis   1. CBC and CMP every 8-12 weeks to monitor for medication toxicity.   2. No recent serious infections.  3. Refill today  Primary osteoarthritis involving multiple joints  1. Tylenol PRN as directed is fine.  2. She has tried NSAIDs like Celebrex PRN  3. She also has tried taking glucosamine supplements  4. She has done some physical therapy     Orders Placed  This Encounter   Procedures    CBC Auto Differential    Aldolase    CK    Comprehensive Metabolic Panel    C-reactive Protein    Sedimentation Rate       New Medications Ordered This Visit   Medications    mycophenolate (CELLCEPT) 500 MG tablet     Sig: Take 3 tablets by mouth 2 (Two) Times a Day.     Dispense:  540 tablet     Refill:  1           Follow Up:   Return in about 4 months (around 2/17/2025).      Ghassan Hopper DO  [facility]

## 2024-10-17 NOTE — ASSESSMENT & PLAN NOTE
* IV Rituximab every  6 months for polymyositis  * 1st doses given 3/30/21 & 4/16/21  * 2nd doses given 10/27/21 & 11/10/21   * 3rd doses given 5/10/22 & 5/25/22  4th doses given 10/5/23 and 10/19/23   5th doses given 6/28/24 and 7/15/24  Hold if the patient develops infection.   Avoid live vaccines while on this medication.   No recent serious infections  No infusion reactions  Also hold this medication perioperatively if the patient is going to have a surgical procedure

## 2024-10-17 NOTE — TELEPHONE ENCOUNTER
----- Message from Ghassan Hopper sent at 10/17/2024 11:58 AM EDT -----  Regarding: Rituximab  Can we plan/schedule the next Rituximab infusions.

## 2024-10-17 NOTE — ASSESSMENT & PLAN NOTE
* Medications/treatments/interventions tried include: Tylenol, gabapentin, glucosamine/chondroitin, Desvenlafaxine, Celebrex, prednisone, Cortisone injections, MTX/folic acid, Rituximab, physical therapy   * 6/17/20: CBC normal, BMP normal, CPK was 583 (), RF negative, Uric acid 5.7, ESR 7.0, CRP normal, Aldolase was 24.1 (<10.3)  * 5/19/20: B12 normal, Folate normal, Lyme negative, RF negative, COVID negative, DANTE direct positive, Narcisa 1 positive, DS DNA negative, RNP normal, Treviño normal, SCL 70 normal, SSA and SSB normal, Chromatin normal, Centromere normal  * She established care here at the [facility] as of 7/2/20. She is Narcisa-1 and Ro-52 positive. She is DANTE positive. We have prescribed her MTX/folic acid and prednisone 20 mg/day. No recent injuries or infections. No fevers. CT scan of the chest done 7/8/20 showed ground glass opacities. This is believed to be secondary to her autoimmune disease. She is now seeing pulmonology. CT scan of the abd/pelvis showed a left adnexa lesion. We asked her to see her gynecologist regarding this issue.     1. She has been found to have a ground glass opacities as of 7/8/20. Most likely she has ILD due to her polymyositis. She has seen pulmonology   2. MTX was discontinued due to her pulmonary issues. She is now on CellCept currently 1500 mg PO BID. This is well tolerated.  3. She should ensure she is up to date with age appropriate cancer screening.   4. Check labs every 8-12 weeks to assess disease activity and monitor for medication toxicity.  Her most recent labs were done 10/10/24. No labs needed today. I will put lab orders in for future labs to be done.   5. Continue Rituximab. We will plan/scheduled her next dosing. Risks and benefits reviewed. She gets this every 6 months.   6. Follow up with us in 3-4 months.   7. Refill medications  8. Her prognosis is fair  9. She is no longer on steroids.  10. We gave her a handout on OA to take home and  review.

## 2024-10-17 NOTE — TELEPHONE ENCOUNTER
Called and left Malou a message that since she is an established patient with the infusion center, she can call and schedule next appointments directly with them so she can go over availability with them, if she wants. I left the IV scheduling and my direct line to call back.

## 2024-11-13 ENCOUNTER — HOSPITAL ENCOUNTER (OUTPATIENT)
Dept: CT IMAGING | Facility: HOSPITAL | Age: 74
Discharge: HOME | End: 2024-11-13
Admitting: INTERNAL MEDICINE
Payer: MEDICARE

## 2024-11-13 DIAGNOSIS — R91.8 GROUND GLASS OPACITY PRESENT ON IMAGING OF LUNG: ICD-10-CM

## 2024-11-13 DIAGNOSIS — J84.9 ILD (INTERSTITIAL LUNG DISEASE): ICD-10-CM

## 2024-11-13 PROCEDURE — 71250 CT THORAX DX C-: CPT

## 2024-12-03 ENCOUNTER — DOCUMENTATION (OUTPATIENT)
Dept: PULMONOLOGY | Facility: CLINIC | Age: 74
End: 2024-12-03
Payer: MEDICARE

## 2024-12-03 NOTE — PROGRESS NOTES
The patient underwent a CT scan of the chest which I have reviewed on PACS.  Stability in the previously noted groundglass opacities    I discussed these results with the patient on the phone.  We discussed the medication changes that have been made by Dr. Hopper.    At this point we will continue to follow with serial chest imaging and PFTs    I encouraged her to keep her follow-up appointment in February

## 2025-01-15 ENCOUNTER — HOSPITAL ENCOUNTER (OUTPATIENT)
Dept: ONCOLOGY | Facility: HOSPITAL | Age: 75
Discharge: HOME | End: 2025-01-15
Admitting: INTERNAL MEDICINE
Payer: MEDICARE

## 2025-01-15 VITALS
SYSTOLIC BLOOD PRESSURE: 115 MMHG | HEART RATE: 63 BPM | RESPIRATION RATE: 16 BRPM | HEIGHT: 65 IN | WEIGHT: 175 LBS | TEMPERATURE: 97.8 F | DIASTOLIC BLOOD PRESSURE: 63 MMHG | BODY MASS INDEX: 29.16 KG/M2

## 2025-01-15 DIAGNOSIS — M33.20 POLYMYOSITIS: Primary | ICD-10-CM

## 2025-01-15 PROCEDURE — 96375 TX/PRO/DX INJ NEW DRUG ADDON: CPT

## 2025-01-15 PROCEDURE — A9270 NON-COVERED ITEM OR SERVICE: HCPCS | Performed by: INTERNAL MEDICINE

## 2025-01-15 PROCEDURE — 96413 CHEMO IV INFUSION 1 HR: CPT

## 2025-01-15 PROCEDURE — 63710000001 ACETAMINOPHEN 325 MG TABLET: Performed by: INTERNAL MEDICINE

## 2025-01-15 PROCEDURE — 25010000002 RITUXIMAB 10 MG/ML SOLUTION 50 ML VIAL: Performed by: INTERNAL MEDICINE

## 2025-01-15 PROCEDURE — 25810000003 SODIUM CHLORIDE 0.9 % SOLUTION 250 ML FLEX CONT: Performed by: INTERNAL MEDICINE

## 2025-01-15 PROCEDURE — 63710000001 CETIRIZINE 10 MG TABLET: Performed by: INTERNAL MEDICINE

## 2025-01-15 PROCEDURE — 25010000002 METHYLPREDNISOLONE PER 125 MG: Performed by: INTERNAL MEDICINE

## 2025-01-15 PROCEDURE — 96415 CHEMO IV INFUSION ADDL HR: CPT

## 2025-01-15 RX ORDER — CETIRIZINE HYDROCHLORIDE 10 MG/1
10 TABLET ORAL ONCE
Status: COMPLETED | OUTPATIENT
Start: 2025-01-15 | End: 2025-01-15

## 2025-01-15 RX ORDER — METHYLPREDNISOLONE SODIUM SUCCINATE 125 MG/2ML
125 INJECTION INTRAMUSCULAR; INTRAVENOUS ONCE
Start: 2025-01-29

## 2025-01-15 RX ORDER — FAMOTIDINE 10 MG/ML
20 INJECTION, SOLUTION INTRAVENOUS AS NEEDED
OUTPATIENT
Start: 2025-01-29

## 2025-01-15 RX ORDER — DIPHENHYDRAMINE HYDROCHLORIDE 50 MG/ML
50 INJECTION INTRAMUSCULAR; INTRAVENOUS AS NEEDED
OUTPATIENT
Start: 2025-01-29

## 2025-01-15 RX ORDER — ACETAMINOPHEN 325 MG/1
650 TABLET ORAL ONCE
OUTPATIENT
Start: 2025-01-29

## 2025-01-15 RX ORDER — SODIUM CHLORIDE 9 MG/ML
20 INJECTION, SOLUTION INTRAVENOUS ONCE
Status: COMPLETED | OUTPATIENT
Start: 2025-01-15 | End: 2025-01-15

## 2025-01-15 RX ORDER — LORATADINE 10 MG/1
10 TABLET ORAL ONCE
Start: 2025-01-29

## 2025-01-15 RX ORDER — DIPHENHYDRAMINE HYDROCHLORIDE 50 MG/ML
50 INJECTION INTRAMUSCULAR; INTRAVENOUS AS NEEDED
Status: DISCONTINUED | OUTPATIENT
Start: 2025-01-15 | End: 2025-01-16 | Stop reason: HOSPADM

## 2025-01-15 RX ORDER — SODIUM CHLORIDE 9 MG/ML
20 INJECTION, SOLUTION INTRAVENOUS ONCE
OUTPATIENT
Start: 2025-01-29

## 2025-01-15 RX ORDER — METHYLPREDNISOLONE SODIUM SUCCINATE 125 MG/2ML
125 INJECTION INTRAMUSCULAR; INTRAVENOUS ONCE
Status: COMPLETED | OUTPATIENT
Start: 2025-01-15 | End: 2025-01-15

## 2025-01-15 RX ORDER — FAMOTIDINE 10 MG/ML
20 INJECTION, SOLUTION INTRAVENOUS AS NEEDED
Status: DISCONTINUED | OUTPATIENT
Start: 2025-01-15 | End: 2025-01-16 | Stop reason: HOSPADM

## 2025-01-15 RX ORDER — ACETAMINOPHEN 325 MG/1
650 TABLET ORAL ONCE
Status: COMPLETED | OUTPATIENT
Start: 2025-01-15 | End: 2025-01-15

## 2025-01-15 RX ADMIN — CETIRIZINE HYDROCHLORIDE 10 MG: 10 TABLET, FILM COATED ORAL at 08:28

## 2025-01-15 RX ADMIN — METHYLPREDNISOLONE SODIUM SUCCINATE 125 MG: 125 INJECTION INTRAMUSCULAR; INTRAVENOUS at 08:28

## 2025-01-15 RX ADMIN — SODIUM CHLORIDE 20 ML/HR: 9 INJECTION, SOLUTION INTRAVENOUS at 08:30

## 2025-01-15 RX ADMIN — RITUXIMAB 1000 MG: 10 INJECTION, SOLUTION INTRAVENOUS at 09:03

## 2025-01-15 RX ADMIN — ACETAMINOPHEN 650 MG: 325 TABLET ORAL at 08:27

## 2025-01-29 ENCOUNTER — HOSPITAL ENCOUNTER (OUTPATIENT)
Dept: ONCOLOGY | Facility: HOSPITAL | Age: 75
Discharge: HOME | End: 2025-01-29
Admitting: INTERNAL MEDICINE
Payer: MEDICARE

## 2025-01-29 VITALS
TEMPERATURE: 98.6 F | HEIGHT: 65 IN | HEART RATE: 67 BPM | WEIGHT: 177 LBS | RESPIRATION RATE: 18 BRPM | BODY MASS INDEX: 29.49 KG/M2 | DIASTOLIC BLOOD PRESSURE: 67 MMHG | SYSTOLIC BLOOD PRESSURE: 121 MMHG

## 2025-01-29 DIAGNOSIS — M33.20 POLYMYOSITIS: Primary | ICD-10-CM

## 2025-01-29 PROCEDURE — 25810000003 SODIUM CHLORIDE 0.9 % SOLUTION 250 ML FLEX CONT: Performed by: INTERNAL MEDICINE

## 2025-01-29 PROCEDURE — 25010000002 RITUXIMAB 10 MG/ML SOLUTION 50 ML VIAL: Performed by: INTERNAL MEDICINE

## 2025-01-29 PROCEDURE — 25810000003 SODIUM CHLORIDE 0.9 % SOLUTION: Performed by: INTERNAL MEDICINE

## 2025-01-29 PROCEDURE — A9270 NON-COVERED ITEM OR SERVICE: HCPCS | Performed by: INTERNAL MEDICINE

## 2025-01-29 PROCEDURE — 96413 CHEMO IV INFUSION 1 HR: CPT

## 2025-01-29 PROCEDURE — 63710000001 ACETAMINOPHEN 325 MG TABLET: Performed by: INTERNAL MEDICINE

## 2025-01-29 PROCEDURE — 63710000001 CETIRIZINE 10 MG TABLET: Performed by: INTERNAL MEDICINE

## 2025-01-29 PROCEDURE — 96375 TX/PRO/DX INJ NEW DRUG ADDON: CPT

## 2025-01-29 PROCEDURE — 96415 CHEMO IV INFUSION ADDL HR: CPT

## 2025-01-29 PROCEDURE — 25010000002 METHYLPREDNISOLONE PER 125 MG: Performed by: INTERNAL MEDICINE

## 2025-01-29 RX ORDER — DIPHENHYDRAMINE HYDROCHLORIDE 50 MG/ML
50 INJECTION INTRAMUSCULAR; INTRAVENOUS AS NEEDED
OUTPATIENT
Start: 2025-07-14

## 2025-01-29 RX ORDER — SODIUM CHLORIDE 9 MG/ML
20 INJECTION, SOLUTION INTRAVENOUS ONCE
Status: COMPLETED | OUTPATIENT
Start: 2025-01-29 | End: 2025-01-29

## 2025-01-29 RX ORDER — ACETAMINOPHEN 325 MG/1
650 TABLET ORAL ONCE
Status: COMPLETED | OUTPATIENT
Start: 2025-01-29 | End: 2025-01-29

## 2025-01-29 RX ORDER — ACETAMINOPHEN 325 MG/1
650 TABLET ORAL ONCE
OUTPATIENT
Start: 2025-07-14

## 2025-01-29 RX ORDER — LORATADINE 10 MG/1
10 TABLET ORAL ONCE
Start: 2025-07-14

## 2025-01-29 RX ORDER — METHYLPREDNISOLONE SODIUM SUCCINATE 125 MG/2ML
125 INJECTION INTRAMUSCULAR; INTRAVENOUS ONCE
Status: COMPLETED | OUTPATIENT
Start: 2025-01-29 | End: 2025-01-29

## 2025-01-29 RX ORDER — FAMOTIDINE 10 MG/ML
20 INJECTION, SOLUTION INTRAVENOUS AS NEEDED
OUTPATIENT
Start: 2025-07-14

## 2025-01-29 RX ORDER — CETIRIZINE HYDROCHLORIDE 10 MG/1
10 TABLET ORAL ONCE
Status: COMPLETED | OUTPATIENT
Start: 2025-01-29 | End: 2025-01-29

## 2025-01-29 RX ORDER — SODIUM CHLORIDE 9 MG/ML
20 INJECTION, SOLUTION INTRAVENOUS ONCE
OUTPATIENT
Start: 2025-07-14

## 2025-01-29 RX ORDER — METHYLPREDNISOLONE SODIUM SUCCINATE 125 MG/2ML
125 INJECTION INTRAMUSCULAR; INTRAVENOUS ONCE
Start: 2025-07-14

## 2025-01-29 RX ADMIN — METHYLPREDNISOLONE SODIUM SUCCINATE 125 MG: 125 INJECTION INTRAMUSCULAR; INTRAVENOUS at 08:45

## 2025-01-29 RX ADMIN — CETIRIZINE HYDROCHLORIDE 10 MG: 10 TABLET, FILM COATED ORAL at 08:42

## 2025-01-29 RX ADMIN — ACETAMINOPHEN 650 MG: 325 TABLET ORAL at 08:42

## 2025-01-29 RX ADMIN — SODIUM CHLORIDE 1000 MG: 9 INJECTION, SOLUTION INTRAVENOUS at 09:20

## 2025-01-29 RX ADMIN — SODIUM CHLORIDE 20 ML/HR: 9 INJECTION, SOLUTION INTRAVENOUS at 08:41

## 2025-02-12 ENCOUNTER — OFFICE VISIT (OUTPATIENT)
Dept: PULMONOLOGY | Facility: CLINIC | Age: 75
End: 2025-02-12
Payer: MEDICARE

## 2025-02-12 VITALS
SYSTOLIC BLOOD PRESSURE: 122 MMHG | DIASTOLIC BLOOD PRESSURE: 72 MMHG | TEMPERATURE: 96.9 F | HEIGHT: 65 IN | WEIGHT: 177 LBS | BODY MASS INDEX: 29.49 KG/M2

## 2025-02-12 DIAGNOSIS — I51.89 DIASTOLIC DYSFUNCTION: ICD-10-CM

## 2025-02-12 DIAGNOSIS — R91.8 GROUND GLASS OPACITY PRESENT ON IMAGING OF LUNG: ICD-10-CM

## 2025-02-12 DIAGNOSIS — D84.821 IMMUNODEFICIENCY DUE TO DRUG THERAPY: Chronic | ICD-10-CM

## 2025-02-12 DIAGNOSIS — Z79.899 IMMUNODEFICIENCY DUE TO DRUG THERAPY: Chronic | ICD-10-CM

## 2025-02-12 DIAGNOSIS — Z86.16 HISTORY OF COVID-19: ICD-10-CM

## 2025-02-12 DIAGNOSIS — J84.9 ILD (INTERSTITIAL LUNG DISEASE): Primary | ICD-10-CM

## 2025-02-12 DIAGNOSIS — R76.8 ANA POSITIVE: ICD-10-CM

## 2025-02-12 NOTE — PROGRESS NOTES
PULMONARY  NOTE    Chief Complaint     Polymyositis, autoimmune ILD, groundglass opacities, GERD, diastolic dysfunction    History of Present Illness     75-year-old female returns today for follow-up  I last saw her 7/30/2024    She has polymyositis followed by Dr. Hopper  Previously on prednisone, now resolved  She remains on mycophenolate and rituximab  Last dose of rituximab was in January    No exacerbation of symptoms  No regular respiratory symptoms    We have been following with serial chest imaging and PFTs  Most recent CT scan of the chest is as noted below    Patient Active Problem List   Diagnosis    Polymyositis    Ground glass opacities on Chest CT    GERD    Remote smoker (None since 1993)    Diastolic dysfunction    Dyspnea on exertion    Gastic ulcer    History of COVID-19 (9/2022)    Autoimmune ILD (interstitial lung disease)    DANTE positive    Osteoarthritis    Immunodeficiency due to drug therapy    High risk medication use      Allergies   Allergen Reactions    Ciprofloxacin Mental Status Change and Anxiety       Current Outpatient Medications:     Bempedoic Acid-Ezetimibe (Nexlizet) 180-10 MG tablet, Take 1 tablet by mouth Daily., Disp: , Rfl:     Biotin 5000 MCG tablet, Take 1 tablet by mouth Daily., Disp: , Rfl:     calcium carb-cholecalciferol 600-800 MG-UNIT tablet, , Disp: , Rfl:     Coenzyme Q-10 100 MG capsule, Take 1 capsule by mouth Daily., Disp: , Rfl:     desvenlafaxine (PRISTIQ) 50 MG 24 hr tablet, Take 1 tablet by mouth Daily., Disp: , Rfl:     glucosamine-chondroitin 500-400 MG capsule capsule, Take 1 capsule by mouth Daily., Disp: , Rfl:     levothyroxine (SYNTHROID, LEVOTHROID) 88 MCG tablet, Take 1 tablet by mouth Daily., Disp: , Rfl:     Multiple Vitamins-Minerals (CENTRUM SILVER 50+WOMEN) tablet, , Disp: , Rfl:     mycophenolate (CELLCEPT) 500 MG tablet, Take 3 tablets by mouth 2 (Two) Times a Day., Disp: 540 tablet, Rfl: 1    omeprazole (priLOSEC) 20 MG capsule, Take 1  "capsule by mouth Daily., Disp: , Rfl:     potassium chloride (MICRO-K) 10 MEQ CR capsule, Take 1 capsule by mouth 2 (Two) Times a Day., Disp: , Rfl:     riTUXimab (Rituxan) 100 MG/10ML solution injection, Administer RITUXAN 1000mg IV week 0, 2, then every 6 months, Disp: , Rfl:     rOPINIRole (REQUIP) 0.5 MG tablet, 2 tablets., Disp: , Rfl:     triamterene-hydrochlorothiazide (MAXZIDE-25) 37.5-25 MG per tablet, Take 1 tablet by mouth Daily., Disp: , Rfl:   MEDICATION LIST AND ALLERGIES REVIEWED.    Family History   Problem Relation Age of Onset    Alzheimer's disease Mother     COPD Father     Heart failure Father     Diabetes Maternal Grandmother     Heart failure Maternal Grandfather      Social History     Tobacco Use    Smoking status: Former     Current packs/day: 0.00     Average packs/day: 2.0 packs/day for 15.0 years (30.0 ttl pk-yrs)     Types: Cigarettes     Start date: 1978     Quit date: 1993     Years since quittin.5     Passive exposure: Past    Smokeless tobacco: Never   Vaping Use    Vaping status: Never Used   Substance Use Topics    Alcohol use: Yes    Drug use: Never     Social History     Social History Narrative    Single    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    Review of Systems  IF PRESENT REFER TO SCANNED ROS SHEET FROM SAME DATE  OTHERWISE ROS OBTAINED AND NON-CONTRIBUTORY OVER HPI.    /72   Temp 96.9 °F (36.1 °C) (Infrared)   Ht 165.1 cm (65\")   Wt 80.3 kg (177 lb)   BMI 29.45 kg/m²   Physical Exam  Vitals and nursing note reviewed.   Constitutional:       General: She is not in acute distress.     Appearance: She is well-developed. She is not diaphoretic.   HENT:      Head: Normocephalic and atraumatic.   Neck:      Thyroid: No " thyromegaly.   Cardiovascular:      Rate and Rhythm: Normal rate and regular rhythm.      Heart sounds: Normal heart sounds. No murmur heard.  Pulmonary:      Effort: Pulmonary effort is normal.      Breath sounds: Normal breath sounds. No stridor.   Lymphadenopathy:      Cervical: No cervical adenopathy.      Upper Body:      Right upper body: No supraclavicular or epitrochlear adenopathy.      Left upper body: No supraclavicular or epitrochlear adenopathy.   Skin:     General: Skin is warm and dry.   Neurological:      Mental Status: She is alert and oriented to person, place, and time.   Psychiatric:         Behavior: Behavior normal.         Results     CT scan of the chest from 11/13/2024 reveals some patchy basilar groundglass opacities    Immunization History   Administered Date(s) Administered    ABRYSVO (RSV, 60+ or pregnant women 32-36 wks) 09/12/2023    COVID-19 (MODERNA) 12YRS+ (SPIKEVAX) 09/20/2024    COVID-19 (MODERNA) Monovalent Original Booster 10/20/2023    COVID-19 (PFIZER) BIVALENT 12+YRS 01/04/2023    COVID-19 (PFIZER) Purple Cap Monovalent 02/03/2021, 03/02/2021, 09/16/2021    Covid-19 (Pfizer) Gray Cap Monovalent 04/02/2022    Fluzone High-Dose 65+YRS 09/14/2021, 09/20/2024    Fluzone High-Dose 65+yrs 09/12/2023    Hepatitis A 12/04/2019    INFLUENZA SPLIT TRI 09/16/2021    Influenza, Unspecified 09/29/2020     Problem List       ICD-10-CM ICD-9-CM   1. Autoimmune ILD (interstitial lung disease)  J84.9 515   2. DANTE positive  R76.8 795.79   3. Diastolic dysfunction  I51.89 429.9   4. Ground glass opacities on Chest CT  R91.8 793.19   5. History of COVID-19 (9/2022)  Z86.16 V12.09   6. Immunodeficiency due to drug therapy  D84.821 V58.69    Z79.899        Discussion     We reviewed her chest imaging together on PACS  She still has a few basilar groundglass opacities but subjectively, in comparison to 2022, I think it is improved overall  Symptoms are stable    At this point I have encouraged her  to remain on her current anti-inflammatory therapy and we will continue to follow lung function with periodic PFTs  Also, periodic chest imaging, as well    I will plan to see her back in 6 months or earlier if there are any problems in the meantime and we will get PFTs on return    This visit represents an established relationship with whom the provider is providing ongoing longitudinal care related to serious and/or complex conditions    Moderate level of Medical Decision Making complexity based on 1 undiagnosed new problem or 2 stable chronic conditions, independent interpretation of tests, and/or prescription drug management     Edwardo Drake MD  Note electronically signed    CC: Flory Delgado, DO

## 2025-03-12 ENCOUNTER — LAB (OUTPATIENT)
Dept: LAB | Facility: HOSPITAL | Age: 75
End: 2025-03-12
Payer: MEDICARE

## 2025-03-12 DIAGNOSIS — M15.0 PRIMARY OSTEOARTHRITIS INVOLVING MULTIPLE JOINTS: Chronic | ICD-10-CM

## 2025-03-12 DIAGNOSIS — Z79.899 IMMUNODEFICIENCY DUE TO DRUG THERAPY: Chronic | ICD-10-CM

## 2025-03-12 DIAGNOSIS — D84.821 IMMUNODEFICIENCY DUE TO DRUG THERAPY: Chronic | ICD-10-CM

## 2025-03-12 DIAGNOSIS — M33.20 POLYMYOSITIS: Chronic | ICD-10-CM

## 2025-03-12 DIAGNOSIS — Z79.899 HIGH RISK MEDICATION USE: Chronic | ICD-10-CM

## 2025-03-12 LAB
ALBUMIN SERPL-MCNC: 4.4 G/DL (ref 3.5–5.2)
ALBUMIN/GLOB SERPL: 1.9 G/DL
ALP SERPL-CCNC: 101 U/L (ref 39–117)
ALT SERPL W P-5'-P-CCNC: 18 U/L (ref 1–33)
ANION GAP SERPL CALCULATED.3IONS-SCNC: 11 MMOL/L (ref 5–15)
AST SERPL-CCNC: 23 U/L (ref 1–32)
BASOPHILS # BLD AUTO: 0.04 10*3/MM3 (ref 0–0.2)
BASOPHILS NFR BLD AUTO: 0.6 % (ref 0–1.5)
BILIRUB SERPL-MCNC: 0.3 MG/DL (ref 0–1.2)
BUN SERPL-MCNC: 20 MG/DL (ref 8–23)
BUN/CREAT SERPL: 27 (ref 7–25)
CALCIUM SPEC-SCNC: 9.9 MG/DL (ref 8.6–10.5)
CHLORIDE SERPL-SCNC: 103 MMOL/L (ref 98–107)
CK SERPL-CCNC: 40 U/L (ref 20–180)
CO2 SERPL-SCNC: 25 MMOL/L (ref 22–29)
CREAT SERPL-MCNC: 0.74 MG/DL (ref 0.57–1)
CRP SERPL-MCNC: <0.3 MG/DL (ref 0–0.5)
DEPRECATED RDW RBC AUTO: 42.2 FL (ref 37–54)
EGFRCR SERPLBLD CKD-EPI 2021: 85 ML/MIN/1.73
EOSINOPHIL # BLD AUTO: 0.1 10*3/MM3 (ref 0–0.4)
EOSINOPHIL NFR BLD AUTO: 1.5 % (ref 0.3–6.2)
ERYTHROCYTE [DISTWIDTH] IN BLOOD BY AUTOMATED COUNT: 13 % (ref 12.3–15.4)
ERYTHROCYTE [SEDIMENTATION RATE] IN BLOOD: 2 MM/HR (ref 0–30)
GLOBULIN UR ELPH-MCNC: 2.3 GM/DL
GLUCOSE SERPL-MCNC: 81 MG/DL (ref 65–99)
HCT VFR BLD AUTO: 40.3 % (ref 34–46.6)
HGB BLD-MCNC: 13.7 G/DL (ref 12–15.9)
IMM GRANULOCYTES # BLD AUTO: 0.02 10*3/MM3 (ref 0–0.05)
IMM GRANULOCYTES NFR BLD AUTO: 0.3 % (ref 0–0.5)
LYMPHOCYTES # BLD AUTO: 1.75 10*3/MM3 (ref 0.7–3.1)
LYMPHOCYTES NFR BLD AUTO: 27 % (ref 19.6–45.3)
MCH RBC QN AUTO: 30.2 PG (ref 26.6–33)
MCHC RBC AUTO-ENTMCNC: 34 G/DL (ref 31.5–35.7)
MCV RBC AUTO: 88.8 FL (ref 79–97)
MONOCYTES # BLD AUTO: 0.98 10*3/MM3 (ref 0.1–0.9)
MONOCYTES NFR BLD AUTO: 15.1 % (ref 5–12)
NEUTROPHILS NFR BLD AUTO: 3.6 10*3/MM3 (ref 1.7–7)
NEUTROPHILS NFR BLD AUTO: 55.5 % (ref 42.7–76)
NRBC BLD AUTO-RTO: 0 /100 WBC (ref 0–0.2)
PLATELET # BLD AUTO: 230 10*3/MM3 (ref 140–450)
PMV BLD AUTO: 11.7 FL (ref 6–12)
POTASSIUM SERPL-SCNC: 4.3 MMOL/L (ref 3.5–5.2)
PROT SERPL-MCNC: 6.7 G/DL (ref 6–8.5)
RBC # BLD AUTO: 4.54 10*6/MM3 (ref 3.77–5.28)
SODIUM SERPL-SCNC: 139 MMOL/L (ref 136–145)
WBC NRBC COR # BLD AUTO: 6.49 10*3/MM3 (ref 3.4–10.8)

## 2025-03-12 PROCEDURE — 85652 RBC SED RATE AUTOMATED: CPT

## 2025-03-12 PROCEDURE — 85025 COMPLETE CBC W/AUTO DIFF WBC: CPT

## 2025-03-12 PROCEDURE — 82550 ASSAY OF CK (CPK): CPT

## 2025-03-12 PROCEDURE — 82085 ASSAY OF ALDOLASE: CPT

## 2025-03-12 PROCEDURE — 36415 COLL VENOUS BLD VENIPUNCTURE: CPT

## 2025-03-12 PROCEDURE — 80053 COMPREHEN METABOLIC PANEL: CPT

## 2025-03-12 PROCEDURE — 86140 C-REACTIVE PROTEIN: CPT

## 2025-03-13 LAB — ALDOLASE SERPL-CCNC: 2.9 U/L (ref 3.3–10.3)

## 2025-03-20 ENCOUNTER — OFFICE VISIT (OUTPATIENT)
Age: 75
End: 2025-03-20
Payer: MEDICARE

## 2025-03-20 VITALS
WEIGHT: 180 LBS | DIASTOLIC BLOOD PRESSURE: 70 MMHG | TEMPERATURE: 97.3 F | HEART RATE: 88 BPM | SYSTOLIC BLOOD PRESSURE: 120 MMHG | HEIGHT: 65 IN | BODY MASS INDEX: 29.99 KG/M2

## 2025-03-20 DIAGNOSIS — M33.20 POLYMYOSITIS: Primary | Chronic | ICD-10-CM

## 2025-03-20 DIAGNOSIS — M15.0 PRIMARY OSTEOARTHRITIS INVOLVING MULTIPLE JOINTS: Chronic | ICD-10-CM

## 2025-03-20 DIAGNOSIS — D84.821 IMMUNODEFICIENCY DUE TO DRUG THERAPY: Chronic | ICD-10-CM

## 2025-03-20 DIAGNOSIS — Z79.899 IMMUNODEFICIENCY DUE TO DRUG THERAPY: Chronic | ICD-10-CM

## 2025-03-20 DIAGNOSIS — Z79.899 HIGH RISK MEDICATION USE: Chronic | ICD-10-CM

## 2025-03-20 RX ORDER — MYCOPHENOLATE MOFETIL 500 MG/1
1500 TABLET ORAL 2 TIMES DAILY
Qty: 540 TABLET | Refills: 1 | Status: SHIPPED | OUTPATIENT
Start: 2025-03-20

## 2025-03-20 NOTE — ASSESSMENT & PLAN NOTE
IV Rituximab every  6 months for polymyositis  * 1st doses given 3/30/21 & 4/16/21  * 2nd doses given 10/27/21 & 11/10/21   * 3rd doses given 5/10/22 & 5/25/22  4th doses given 10/5/23 and 10/19/23   5th doses given 6/28/24 and 7/15/24  6/th doses given 1/15/25 and 1/29/25  Hold if the patient develops infection.   Avoid live vaccines while on this medication.   No recent serious infections  No infusion reactions  Also hold this medication perioperatively if the patient is going to have a surgical procedure

## 2025-03-20 NOTE — PROGRESS NOTES
Office Follow Up      Date: 03/20/2025   Patient Name: Malou Raymond  MRN: [id number]  YOB: 1950    Referring Physician: No ref. provider found     Chief Complaint   Patient presents with    Polymyositis      Follow up     Osteoarthritis     Follow up       History of Present Illness: Malou Raymond is a 75 y.o. female who is here today for follow up.     She established care here with us as of 7/2/20. She is Narcisa-1 and Ro-52 positive. She is DANTE positive. We initially prescribed her MTX/folic acid and prednisone. MTX was stopped due to ILD and CellCept was started as an alternative.     She is not on steroids currently.     She received her initial Rituximab doses on 3/30/21 and 4/19/21. Her most recent doses of Rituximab were 1/15/25 and 1/29/25.  No infusion reactions.     CT scan of the chest done 7/8/20 showed ground glass opacities. 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 She has seen pulmonology.    Today she rates her pain as 1/10 in severity. She has 2-3 minutes/day of morning stiffness. No red or hot joints. No muscle pain or weakness. No back or neck problems. No joint swelling.     No rash. No hair loss. No headaches or paresthesias. No lymphadenopathy. No abnormal bruising/bleeding. No GI or  issues. No shortness of breath. No chest pain.  No sicca symptoms. No difficulty swallowing.      Subjective     Review of Systems   Constitutional: Negative.    HENT: Negative.     Eyes: Negative.    Respiratory: Negative.     Cardiovascular: Negative.    Gastrointestinal: Negative.    Endocrine: Negative.    Genitourinary: Negative.    Musculoskeletal:  Positive for arthralgias.   Skin: Negative.    Allergic/Immunologic: Negative.    Neurological: Negative.    Hematological: Negative.    Psychiatric/Behavioral: Negative.     All other systems reviewed and are negative.         Current Outpatient Medications:     Bempedoic Acid-Ezetimibe (Nexlizet)  "180-10 MG tablet, Take 1 tablet by mouth Daily., Disp: , Rfl:     Biotin 5000 MCG tablet, Take 1 tablet by mouth Daily., Disp: , Rfl:     calcium carb-cholecalciferol 600-800 MG-UNIT tablet, , Disp: , Rfl:     Coenzyme Q-10 100 MG capsule, Take 1 capsule by mouth Daily., Disp: , Rfl:     desvenlafaxine (PRISTIQ) 50 MG 24 hr tablet, Take 1 tablet by mouth Daily., Disp: , Rfl:     glucosamine-chondroitin 500-400 MG capsule capsule, Take 1 capsule by mouth Daily., Disp: , Rfl:     levothyroxine (SYNTHROID, LEVOTHROID) 88 MCG tablet, Take 1 tablet by mouth Daily., Disp: , Rfl:     Multiple Vitamins-Minerals (CENTRUM SILVER 50+WOMEN) tablet, , Disp: , Rfl:     mycophenolate (CELLCEPT) 500 MG tablet, Take 3 tablets by mouth 2 (Two) Times a Day., Disp: 540 tablet, Rfl: 1    omeprazole (priLOSEC) 20 MG capsule, Take 1 capsule by mouth Daily., Disp: , Rfl:     potassium chloride (MICRO-K) 10 MEQ CR capsule, Take 1 capsule by mouth 2 (Two) Times a Day., Disp: , Rfl:     riTUXimab (Rituxan) 100 MG/10ML solution injection, Administer RITUXAN 1000mg IV week 0, 2, then every 6 months, Disp: , Rfl:     rOPINIRole (REQUIP) 0.5 MG tablet, 2 tablets., Disp: , Rfl:     triamterene-hydrochlorothiazide (MAXZIDE-25) 37.5-25 MG per tablet, Take 1 tablet by mouth Daily., Disp: , Rfl:     Allergies   Allergen Reactions    Ciprofloxacin Mental Status Change and Anxiety       I have reviewed and updated the patient's chief complaint, history of present illness, review of systems, past medical history, surgical history, family history, social history, medications and allergy list as appropriate.     Objective      Vitals:    03/20/25 1120   BP: 120/70   BP Location: Left arm   Pulse: 88   Temp: 97.3 °F (36.3 °C)   Weight: 81.6 kg (180 lb)   Height: 165.1 cm (65\")   PainSc: 1        Body mass index is 29.95 kg/m².      Physical Exam     General: Well appearing 75 year old  female. Not in distress. She is ambulating unassisted.   SKIN: " No rashes. No alopecia. No subcutaneous nodules. No digital pits or ulcers. No sclerodactyly.   HEENT: NCAT. Conjunctiva clear, no photophobia. No oral or nasal ulcers. Hearing intact.    Pulmonary: Clear to auscultation bilaterally. No wheezing, rales, or rhonchi.  CV: Regular rate and rhythm. No murmurs, rubs, or gallops.   Psych: Normal mood and affect. Alert and oriented x 3.   Extremities: No cyanosis or edema.   Musculoskeletal: No acute swelling or tenderness to palpation. . No warmth or erythema. Normal range of motion of the wrists, ankles, elbows, and knees.   Lymph: No palpable cervical adenopathy  Neuro: 5/5 strength in the proximal upper extremity muscles symmetrically. 5/5 strength proximally and distally in the lower extremities.        Procedures    Assessment / Plan      Assessment & Plan  Polymyositis  * Medications/treatments/interventions tried include: Tylenol, gabapentin, glucosamine/chondroitin, Desvenlafaxine, Celebrex, prednisone, Cortisone injections, MTX/folic acid, Rituximab, physical therapy   * 6/17/20: CBC normal, BMP normal, CPK was 583 (), RF negative, Uric acid 5.7, ESR 7.0, CRP normal, Aldolase was 24.1 (<10.3)  * 5/19/20: B12 normal, Folate normal, Lyme negative, RF negative, COVID negative, DANTE direct positive, Narcisa 1 positive, DS DNA negative, RNP normal, Treviño normal, SCL 70 normal, SSA and SSB normal, Chromatin normal, Centromere normal  * She established care here at the [facility] as of 7/2/20. She is Narcisa-1 and Ro-52 positive. She is DANTE positive. We have prescribed her MTX/folic acid and prednisone 20 mg/day. No recent injuries or infections. No fevers. CT scan of the chest done 7/8/20 showed ground glass opacities. This is believed to be secondary to her autoimmune disease. She is now seeing pulmonology. CT scan of the abd/pelvis showed a left adnexa lesion. We asked her to see her gynecologist regarding this issue.     1. She has been found to have a  ground glass opacities as of 7/8/20. Most likely she has ILD due to her polymyositis. She has seen pulmonology   2. MTX was discontinued due to her pulmonary issues. She is now on CellCept currently 1500 mg PO BID. This is well tolerated.  3. She should ensure she is up to date with age appropriate cancer screening.   4. Check labs every 8-12 weeks to assess disease activity and monitor for medication toxicity.  Her most recent labs were done 10/10/24. No labs needed today. I will put lab orders in for future labs to be done.   5. Continue Rituximab. We will plan/scheduled her next dosing. Risks and benefits reviewed. She gets this every 6 months.   6. Follow up with us in 3-4 months.   7. Refill medications  8. Her prognosis is fair  9. She is no longer on steroids.  10. We gave her a handout on arthritis to take home and review.   Immunodeficiency due to drug therapy   IV Rituximab every  6 months for polymyositis  * 1st doses given 3/30/21 & 4/16/21  * 2nd doses given 10/27/21 & 11/10/21   * 3rd doses given 5/10/22 & 5/25/22  4th doses given 10/5/23 and 10/19/23   5th doses given 6/28/24 and 7/15/24  6/th doses given 1/15/25 and 1/29/25  Hold if the patient develops infection.   Avoid live vaccines while on this medication.   No recent serious infections  No infusion reactions  Also hold this medication perioperatively if the patient is going to have a surgical procedure  High risk medication use  * CellCept 1500 mg PO BID for polymyositis   1. CBC and CMP every 8-12 weeks to monitor for medication toxicity.   2. No recent serious infections.  3. Refill today  Primary osteoarthritis involving multiple joints  1. Tylenol PRN as directed is fine.  2. She has tried NSAIDs like Celebrex PRN  3. She also has tried taking glucosamine supplements  4. She has done some physical therapy     Orders Placed This Encounter   Procedures    CBC Auto Differential    Comprehensive Metabolic Panel    C-reactive Protein     Sedimentation Rate    CK         New Medications Ordered This Visit   Medications    mycophenolate (CELLCEPT) 500 MG tablet     Sig: Take 3 tablets by mouth 2 (Two) Times a Day.     Dispense:  540 tablet     Refill:  1             Follow Up:   Return in about 4 months (around 7/20/2025).      Ghassan Hopper DO  [facility] [place]

## 2025-03-20 NOTE — ASSESSMENT & PLAN NOTE
* Medications/treatments/interventions tried include: Tylenol, gabapentin, glucosamine/chondroitin, Desvenlafaxine, Celebrex, prednisone, Cortisone injections, MTX/folic acid, Rituximab, physical therapy   * 6/17/20: CBC normal, BMP normal, CPK was 583 (), RF negative, Uric acid 5.7, ESR 7.0, CRP normal, Aldolase was 24.1 (<10.3)  * 5/19/20: B12 normal, Folate normal, Lyme negative, RF negative, COVID negative, DANTE direct positive, Narcisa 1 positive, DS DNA negative, RNP normal, Treviño normal, SCL 70 normal, SSA and SSB normal, Chromatin normal, Centromere normal  * She established care here at the [facility] as of 7/2/20. She is Narcisa-1 and Ro-52 positive. She is DANTE positive. We have prescribed her MTX/folic acid and prednisone 20 mg/day. No recent injuries or infections. No fevers. CT scan of the chest done 7/8/20 showed ground glass opacities. This is believed to be secondary to her autoimmune disease. She is now seeing pulmonology. CT scan of the abd/pelvis showed a left adnexa lesion. We asked her to see her gynecologist regarding this issue.     1. She has been found to have a ground glass opacities as of 7/8/20. Most likely she has ILD due to her polymyositis. She has seen pulmonology   2. MTX was discontinued due to her pulmonary issues. She is now on CellCept currently 1500 mg PO BID. This is well tolerated.  3. She should ensure she is up to date with age appropriate cancer screening.   4. Check labs every 8-12 weeks to assess disease activity and monitor for medication toxicity.  Her most recent labs were done 10/10/24. No labs needed today. I will put lab orders in for future labs to be done.   5. Continue Rituximab. We will plan/scheduled her next dosing. Risks and benefits reviewed. She gets this every 6 months.   6. Follow up with us in 3-4 months.   7. Refill medications  8. Her prognosis is fair  9. She is no longer on steroids.  10. We gave her a handout on arthritis to take home  and review.

## 2025-04-16 ENCOUNTER — TELEPHONE (OUTPATIENT)
Age: 75
End: 2025-04-16
Payer: MEDICARE

## 2025-05-06 ENCOUNTER — PATIENT MESSAGE (OUTPATIENT)
Age: 75
End: 2025-05-06
Payer: MEDICARE

## 2025-05-07 ENCOUNTER — TELEPHONE (OUTPATIENT)
Age: 75
End: 2025-05-07
Payer: MEDICARE

## 2025-05-09 NOTE — TELEPHONE ENCOUNTER
Patient called back because she is almost out of the mycophenolate. I called pharmacy and left voicemail advising okay to continue both omeprazole and mycophenolate per CH.

## 2025-07-14 ENCOUNTER — LAB (OUTPATIENT)
Dept: LAB | Facility: HOSPITAL | Age: 75
End: 2025-07-14
Payer: MEDICARE

## 2025-07-14 DIAGNOSIS — D84.821 IMMUNODEFICIENCY DUE TO DRUG THERAPY: Chronic | ICD-10-CM

## 2025-07-14 DIAGNOSIS — Z79.899 IMMUNODEFICIENCY DUE TO DRUG THERAPY: Chronic | ICD-10-CM

## 2025-07-14 DIAGNOSIS — M33.20 POLYMYOSITIS: Chronic | ICD-10-CM

## 2025-07-14 DIAGNOSIS — Z79.899 HIGH RISK MEDICATION USE: Chronic | ICD-10-CM

## 2025-07-14 DIAGNOSIS — M15.0 PRIMARY OSTEOARTHRITIS INVOLVING MULTIPLE JOINTS: Chronic | ICD-10-CM

## 2025-07-14 LAB
BASOPHILS # BLD AUTO: 0.04 10*3/MM3 (ref 0–0.2)
BASOPHILS NFR BLD AUTO: 0.7 % (ref 0–1.5)
DEPRECATED RDW RBC AUTO: 42.1 FL (ref 37–54)
EOSINOPHIL # BLD AUTO: 0.15 10*3/MM3 (ref 0–0.4)
EOSINOPHIL NFR BLD AUTO: 2.8 % (ref 0.3–6.2)
ERYTHROCYTE [DISTWIDTH] IN BLOOD BY AUTOMATED COUNT: 13.1 % (ref 12.3–15.4)
ERYTHROCYTE [SEDIMENTATION RATE] IN BLOOD: 3 MM/HR (ref 0–30)
HCT VFR BLD AUTO: 38.4 % (ref 34–46.6)
HGB BLD-MCNC: 13 G/DL (ref 12–15.9)
IMM GRANULOCYTES # BLD AUTO: 0.01 10*3/MM3 (ref 0–0.05)
IMM GRANULOCYTES NFR BLD AUTO: 0.2 % (ref 0–0.5)
LYMPHOCYTES # BLD AUTO: 1.56 10*3/MM3 (ref 0.7–3.1)
LYMPHOCYTES NFR BLD AUTO: 28.7 % (ref 19.6–45.3)
MCH RBC QN AUTO: 30.4 PG (ref 26.6–33)
MCHC RBC AUTO-ENTMCNC: 33.9 G/DL (ref 31.5–35.7)
MCV RBC AUTO: 89.7 FL (ref 79–97)
MONOCYTES # BLD AUTO: 0.83 10*3/MM3 (ref 0.1–0.9)
MONOCYTES NFR BLD AUTO: 15.3 % (ref 5–12)
NEUTROPHILS NFR BLD AUTO: 2.85 10*3/MM3 (ref 1.7–7)
NEUTROPHILS NFR BLD AUTO: 52.3 % (ref 42.7–76)
NRBC BLD AUTO-RTO: 0 /100 WBC (ref 0–0.2)
PLATELET # BLD AUTO: 259 10*3/MM3 (ref 140–450)
PMV BLD AUTO: 11.3 FL (ref 6–12)
RBC # BLD AUTO: 4.28 10*6/MM3 (ref 3.77–5.28)
WBC NRBC COR # BLD AUTO: 5.44 10*3/MM3 (ref 3.4–10.8)

## 2025-07-14 PROCEDURE — 86140 C-REACTIVE PROTEIN: CPT

## 2025-07-14 PROCEDURE — 80053 COMPREHEN METABOLIC PANEL: CPT

## 2025-07-14 PROCEDURE — 82550 ASSAY OF CK (CPK): CPT

## 2025-07-14 PROCEDURE — 85025 COMPLETE CBC W/AUTO DIFF WBC: CPT

## 2025-07-14 PROCEDURE — 36415 COLL VENOUS BLD VENIPUNCTURE: CPT

## 2025-07-14 PROCEDURE — 85652 RBC SED RATE AUTOMATED: CPT

## 2025-07-15 ENCOUNTER — RESULTS FOLLOW-UP (OUTPATIENT)
Age: 75
End: 2025-07-15
Payer: MEDICARE

## 2025-07-15 LAB
ALBUMIN SERPL-MCNC: 4.4 G/DL (ref 3.5–5.2)
ALBUMIN/GLOB SERPL: 1.7 G/DL
ALP SERPL-CCNC: 98 U/L (ref 39–117)
ALT SERPL W P-5'-P-CCNC: 24 U/L (ref 1–33)
ANION GAP SERPL CALCULATED.3IONS-SCNC: 11.5 MMOL/L (ref 5–15)
AST SERPL-CCNC: 30 U/L (ref 1–32)
BILIRUB SERPL-MCNC: 0.3 MG/DL (ref 0–1.2)
BUN SERPL-MCNC: 18 MG/DL (ref 8–23)
BUN/CREAT SERPL: 18.9 (ref 7–25)
CALCIUM SPEC-SCNC: 10.3 MG/DL (ref 8.6–10.5)
CHLORIDE SERPL-SCNC: 105 MMOL/L (ref 98–107)
CK SERPL-CCNC: 78 U/L (ref 20–180)
CO2 SERPL-SCNC: 26.5 MMOL/L (ref 22–29)
CREAT SERPL-MCNC: 0.95 MG/DL (ref 0.57–1)
CRP SERPL-MCNC: <0.3 MG/DL (ref 0–0.5)
EGFRCR SERPLBLD CKD-EPI 2021: 63 ML/MIN/1.73
GLOBULIN UR ELPH-MCNC: 2.6 GM/DL
GLUCOSE SERPL-MCNC: 102 MG/DL (ref 65–99)
POTASSIUM SERPL-SCNC: 3.7 MMOL/L (ref 3.5–5.2)
PROT SERPL-MCNC: 7 G/DL (ref 6–8.5)
SODIUM SERPL-SCNC: 143 MMOL/L (ref 136–145)

## 2025-07-16 ENCOUNTER — HOSPITAL ENCOUNTER (OUTPATIENT)
Dept: ONCOLOGY | Facility: HOSPITAL | Age: 75
Discharge: HOME | End: 2025-07-16
Admitting: INTERNAL MEDICINE
Payer: MEDICARE

## 2025-07-16 VITALS
RESPIRATION RATE: 16 BRPM | DIASTOLIC BLOOD PRESSURE: 62 MMHG | WEIGHT: 181 LBS | SYSTOLIC BLOOD PRESSURE: 119 MMHG | TEMPERATURE: 98.1 F | HEART RATE: 63 BPM | HEIGHT: 65 IN | BODY MASS INDEX: 30.16 KG/M2

## 2025-07-16 DIAGNOSIS — M33.20 POLYMYOSITIS: Primary | ICD-10-CM

## 2025-07-16 PROCEDURE — 63710000001 ACETAMINOPHEN 325 MG TABLET: Performed by: INTERNAL MEDICINE

## 2025-07-16 PROCEDURE — 96415 CHEMO IV INFUSION ADDL HR: CPT

## 2025-07-16 PROCEDURE — A9270 NON-COVERED ITEM OR SERVICE: HCPCS | Performed by: INTERNAL MEDICINE

## 2025-07-16 PROCEDURE — 96413 CHEMO IV INFUSION 1 HR: CPT

## 2025-07-16 PROCEDURE — A9270 NON-COVERED ITEM OR SERVICE: HCPCS

## 2025-07-16 PROCEDURE — 63710000001 CETIRIZINE 10 MG TABLET

## 2025-07-16 PROCEDURE — 25810000003 SODIUM CHLORIDE 0.9 % SOLUTION: Performed by: INTERNAL MEDICINE

## 2025-07-16 PROCEDURE — 25010000002 RITUXIMAB 10 MG/ML SOLUTION 50 ML VIAL: Performed by: INTERNAL MEDICINE

## 2025-07-16 PROCEDURE — 25810000003 SODIUM CHLORIDE 0.9 % SOLUTION 250 ML FLEX CONT: Performed by: INTERNAL MEDICINE

## 2025-07-16 PROCEDURE — 25010000002 METHYLPREDNISOLONE PER 125 MG: Performed by: INTERNAL MEDICINE

## 2025-07-16 PROCEDURE — 96375 TX/PRO/DX INJ NEW DRUG ADDON: CPT

## 2025-07-16 RX ORDER — METHYLPREDNISOLONE SODIUM SUCCINATE 125 MG/2ML
125 INJECTION INTRAMUSCULAR; INTRAVENOUS ONCE
Start: 2025-07-28

## 2025-07-16 RX ORDER — FAMOTIDINE 10 MG/ML
20 INJECTION, SOLUTION INTRAVENOUS AS NEEDED
Status: DISCONTINUED | OUTPATIENT
Start: 2025-07-16 | End: 2025-07-17 | Stop reason: HOSPADM

## 2025-07-16 RX ORDER — CETIRIZINE HYDROCHLORIDE 10 MG/1
10 TABLET ORAL ONCE
Status: COMPLETED | OUTPATIENT
Start: 2025-07-16 | End: 2025-07-16

## 2025-07-16 RX ORDER — SODIUM CHLORIDE 9 MG/ML
20 INJECTION, SOLUTION INTRAVENOUS ONCE
Status: COMPLETED | OUTPATIENT
Start: 2025-07-16 | End: 2025-07-16

## 2025-07-16 RX ORDER — LORATADINE 10 MG/1
10 TABLET ORAL ONCE
Status: DISCONTINUED | OUTPATIENT
Start: 2025-07-16 | End: 2025-07-16

## 2025-07-16 RX ORDER — DIPHENHYDRAMINE HYDROCHLORIDE 50 MG/ML
50 INJECTION, SOLUTION INTRAMUSCULAR; INTRAVENOUS AS NEEDED
Status: DISCONTINUED | OUTPATIENT
Start: 2025-07-16 | End: 2025-07-17 | Stop reason: HOSPADM

## 2025-07-16 RX ORDER — ACETAMINOPHEN 325 MG/1
650 TABLET ORAL ONCE
Status: COMPLETED | OUTPATIENT
Start: 2025-07-16 | End: 2025-07-16

## 2025-07-16 RX ORDER — LORATADINE 10 MG/1
10 TABLET ORAL ONCE
Start: 2025-07-28

## 2025-07-16 RX ORDER — FAMOTIDINE 10 MG/ML
20 INJECTION, SOLUTION INTRAVENOUS AS NEEDED
OUTPATIENT
Start: 2025-07-28

## 2025-07-16 RX ORDER — DIPHENHYDRAMINE HYDROCHLORIDE 50 MG/ML
50 INJECTION, SOLUTION INTRAMUSCULAR; INTRAVENOUS AS NEEDED
OUTPATIENT
Start: 2025-07-28

## 2025-07-16 RX ORDER — ACETAMINOPHEN 325 MG/1
650 TABLET ORAL ONCE
OUTPATIENT
Start: 2025-07-28

## 2025-07-16 RX ORDER — SODIUM CHLORIDE 9 MG/ML
20 INJECTION, SOLUTION INTRAVENOUS ONCE
OUTPATIENT
Start: 2025-07-28

## 2025-07-16 RX ORDER — METHYLPREDNISOLONE SODIUM SUCCINATE 125 MG/2ML
125 INJECTION INTRAMUSCULAR; INTRAVENOUS ONCE
Status: COMPLETED | OUTPATIENT
Start: 2025-07-16 | End: 2025-07-16

## 2025-07-16 RX ADMIN — CETIRIZINE HYDROCHLORIDE 10 MG: 10 TABLET, FILM COATED ORAL at 08:50

## 2025-07-16 RX ADMIN — ACETAMINOPHEN 650 MG: 325 TABLET ORAL at 08:51

## 2025-07-16 RX ADMIN — METHYLPREDNISOLONE SODIUM SUCCINATE 125 MG: 125 INJECTION, POWDER, FOR SOLUTION INTRAMUSCULAR; INTRAVENOUS at 08:52

## 2025-07-16 RX ADMIN — SODIUM CHLORIDE 20 ML/HR: 9 INJECTION, SOLUTION INTRAVENOUS at 09:21

## 2025-07-16 RX ADMIN — SODIUM CHLORIDE 1000 MG: 9 INJECTION, SOLUTION INTRAVENOUS at 09:25

## 2025-07-21 ENCOUNTER — OFFICE VISIT (OUTPATIENT)
Age: 75
End: 2025-07-21
Payer: MEDICARE

## 2025-07-21 ENCOUNTER — TELEPHONE (OUTPATIENT)
Age: 75
End: 2025-07-21

## 2025-07-21 VITALS
BODY MASS INDEX: 29.92 KG/M2 | TEMPERATURE: 96.9 F | DIASTOLIC BLOOD PRESSURE: 72 MMHG | HEIGHT: 65 IN | HEART RATE: 71 BPM | SYSTOLIC BLOOD PRESSURE: 110 MMHG | RESPIRATION RATE: 16 BRPM | WEIGHT: 179.6 LBS

## 2025-07-21 DIAGNOSIS — M15.0 PRIMARY OSTEOARTHRITIS INVOLVING MULTIPLE JOINTS: Chronic | ICD-10-CM

## 2025-07-21 DIAGNOSIS — M33.20 POLYMYOSITIS: Primary | Chronic | ICD-10-CM

## 2025-07-21 DIAGNOSIS — D84.821 IMMUNODEFICIENCY DUE TO DRUG THERAPY: Chronic | ICD-10-CM

## 2025-07-21 DIAGNOSIS — Z79.899 HIGH RISK MEDICATION USE: Chronic | ICD-10-CM

## 2025-07-21 DIAGNOSIS — Z79.899 IMMUNODEFICIENCY DUE TO DRUG THERAPY: Chronic | ICD-10-CM

## 2025-07-21 RX ORDER — MYCOPHENOLATE MOFETIL 500 MG/1
1500 TABLET ORAL 2 TIMES DAILY
Qty: 540 TABLET | Refills: 1 | Status: SHIPPED | OUTPATIENT
Start: 2025-07-21

## 2025-07-21 NOTE — ASSESSMENT & PLAN NOTE
* Medications/treatments/interventions tried include: Tylenol, gabapentin, glucosamine/chondroitin, Desvenlafaxine, Celebrex, prednisone, Cortisone injections, MTX/folic acid, Rituximab, physical therapy   * 6/17/20: CBC normal, BMP normal, CPK was 583 (), RF negative, Uric acid 5.7, ESR 7.0, CRP normal, Aldolase was 24.1 (<10.3)  * 5/19/20: B12 normal, Folate normal, Lyme negative, RF negative, COVID negative, DANTE direct positive, Narcisa 1 positive, DS DNA negative, RNP normal, Treviño normal, SCL 70 normal, SSA and SSB normal, Chromatin normal, Centromere normal  * She established care here at the [facility] as of 7/2/20. She is Narcisa-1 and Ro-52 positive. She is DANTE positive. We have prescribed her MTX/folic acid and prednisone 20 mg/day. No recent injuries or infections. No fevers. CT scan of the chest done 7/8/20 showed ground glass opacities. This is believed to be secondary to her autoimmune disease. She is now seeing pulmonology. CT scan of the abd/pelvis showed a left adnexa lesion. We asked her to see her gynecologist regarding this issue.     1. She has been found to have a ground glass opacities as of 7/8/20. Most likely she has ILD due to her polymyositis. She has seen pulmonology   2. MTX was discontinued due to her pulmonary issues. She is now on CellCept currently 1500 mg PO BID. This is well tolerated.  3. She should ensure she is up to date with age appropriate cancer screening.   4. Check labs every 8-12 weeks to assess disease activity and monitor for medication toxicity.  Her most recent labs were done 7/14/25. No labs needed today. I will put lab orders in for future labs to be done mid September    5. Continue Rituximab.  She gets this every 6 months. She is asking for an updated PA for next January when she will be due next.   6. Follow up with us in 3-4 months.   7. Refill Cellcept   8. Her prognosis is fair  9. She is no longer on steroids.  10. She is doing well

## 2025-07-21 NOTE — ASSESSMENT & PLAN NOTE
IV Rituximab every  6 months for polymyositis  * 1st doses given 3/30/21 & 4/16/21  * 2nd doses given 10/27/21 & 11/10/21   * 3rd doses given 5/10/22 & 5/25/22  4th doses given 10/5/23 and 10/19/23   5th doses given 6/28/24 and 7/15/24  6/th doses given 1/15/25 and 1/29/25  7th doses given: 7/16/25 and scheduled for 7/30/25  Hold if the patient develops infection.   Avoid live vaccines while on this medication.   No recent serious infections  No infusion reactions  Also hold this medication perioperatively if the patient is going to have a surgical procedure

## 2025-07-21 NOTE — PROGRESS NOTES
Office Follow Up      Date: 07/21/2025   Patient Name: Malou Raymond  MRN: [id number]  YOB: 1950    Referring Physician: No ref. provider found     Chief Complaint   Patient presents with    Polymyositis       History of Present Illness: Malou Raymond is a 75 y.o. female who is here today for follow up.     She established care here with us as of 7/2/20. She is Narcisa-1 and Ro-52 positive. She is DANTE positive. We initially prescribed her MTX/folic acid and prednisone. MTX was stopped due to ILD and CellCept was started as an alternative.     CT scan of the chest done 7/8/20 showed ground glass opacities. 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 She has seen pulmonology.    History of Present Illness  The patient is here for a follow-up visit. She rates her pain severity as 0.5/10, with 5 minutes of early morning stiffness.    Her condition remains unchanged since the last appointment. She is currently undergoing rituximab infusions, with one administered last week and another scheduled for next week. She will need another infusion in January 2026. Her only steroid intake is through these infusions. She is seeking a refill of her CellCept prescription and requires prior authorization for her rituximab for 2026.     She has a scheduled appointment with pulmonology next month, a department she visits twice a year due to opacities noted above. Her last visit was in late fall, and her condition was reported as stable at that time.  She reports having twice a year pulmonary function testing and once yearly CTs.      Subjective     Review of Systems   All other systems reviewed and are negative.         Current Outpatient Medications:     Bempedoic Acid-Ezetimibe (Nexlizet) 180-10 MG tablet, Take 1 tablet by mouth Daily., Disp: , Rfl:     Biotin 5000 MCG tablet, Take 1 tablet by mouth Daily., Disp: , Rfl:     calcium carb-cholecalciferol 600-800 MG-UNIT tablet, ,  "Disp: , Rfl:     Coenzyme Q-10 100 MG capsule, Take 1 capsule by mouth Daily., Disp: , Rfl:     desvenlafaxine (PRISTIQ) 50 MG 24 hr tablet, Take 1 tablet by mouth Daily., Disp: , Rfl:     glucosamine-chondroitin 500-400 MG capsule capsule, Take 1 capsule by mouth Daily., Disp: , Rfl:     levothyroxine (SYNTHROID, LEVOTHROID) 88 MCG tablet, Take 1 tablet by mouth Daily., Disp: , Rfl:     Multiple Vitamins-Minerals (CENTRUM SILVER 50+WOMEN) tablet, , Disp: , Rfl:     mycophenolate (CELLCEPT) 500 MG tablet, Take 3 tablets by mouth 2 (Two) Times a Day., Disp: 540 tablet, Rfl: 1    omeprazole (priLOSEC) 20 MG capsule, Take 1 capsule by mouth Daily., Disp: , Rfl:     potassium chloride (MICRO-K) 10 MEQ CR capsule, Take 1 capsule by mouth 2 (Two) Times a Day., Disp: , Rfl:     riTUXimab (Rituxan) 100 MG/10ML solution injection, Administer RITUXAN 1000mg IV week 0, 2, then every 6 months, Disp: , Rfl:     rOPINIRole (REQUIP) 0.5 MG tablet, 2 tablets., Disp: , Rfl:     triamterene-hydrochlorothiazide (MAXZIDE-25) 37.5-25 MG per tablet, Take 1 tablet by mouth Daily., Disp: , Rfl:     Allergies   Allergen Reactions    Ciprofloxacin Mental Status Change and Anxiety       I have reviewed and updated the patient's chief complaint, history of present illness, review of systems, past medical history, surgical history, family history, social history, medications and allergy list as appropriate.     Objective      Vitals:    07/21/25 1056   BP: 110/72   BP Location: Left arm   Patient Position: Sitting   Cuff Size: Adult   Pulse: 71   Resp: 16   Temp: 96.9 °F (36.1 °C)   TempSrc: Infrared   Weight: 81.5 kg (179 lb 9.6 oz)   Height: 165.1 cm (65\")   PainSc: 0-No pain         Body mass index is 29.89 kg/m².      Physical Exam     General: Well appearing 75 year old  female. Not in distress. She is ambulating unassisted.   SKIN: No rashes. No alopecia. No subcutaneous nodules. No digital pits or ulcers. No sclerodactyly. "   HEENT: NCAT. Conjunctiva clear, no photophobia. No oral or nasal ulcers. Hearing intact.    Pulmonary: Clear to auscultation bilaterally. No wheezing, rales, or rhonchi.  CV: Regular rate and rhythm. No murmurs, rubs, or gallops.   Psych: Normal mood and affect. Alert and oriented x 3.   Extremities: No cyanosis or edema.   Musculoskeletal: No acute swelling or tenderness to palpation. . No warmth or erythema. Normal range of motion of the wrists, ankles, elbows, and knees. No synovitis.   Lymph: No palpable cervical adenopathy  Neuro: 5/5 strength in the proximal upper extremity muscles symmetrically. 5/5 strength proximally and distally in the lower extremities.        Procedures    Assessment / Plan      Assessment & Plan  Polymyositis  * Medications/treatments/interventions tried include: Tylenol, gabapentin, glucosamine/chondroitin, Desvenlafaxine, Celebrex, prednisone, Cortisone injections, MTX/folic acid, Rituximab, physical therapy   * 6/17/20: CBC normal, BMP normal, CPK was 583 (), RF negative, Uric acid 5.7, ESR 7.0, CRP normal, Aldolase was 24.1 (<10.3)  * 5/19/20: B12 normal, Folate normal, Lyme negative, RF negative, COVID negative, DANTE direct positive, Narcisa 1 positive, DS DNA negative, RNP normal, Treviño normal, SCL 70 normal, SSA and SSB normal, Chromatin normal, Centromere normal  * She established care here at the [facility] as of 7/2/20. She is Narcisa-1 and Ro-52 positive. She is DANTE positive. We have prescribed her MTX/folic acid and prednisone 20 mg/day. No recent injuries or infections. No fevers. CT scan of the chest done 7/8/20 showed ground glass opacities. This is believed to be secondary to her autoimmune disease. She is now seeing pulmonology. CT scan of the abd/pelvis showed a left adnexa lesion. We asked her to see her gynecologist regarding this issue.     1. She has been found to have a ground glass opacities as of 7/8/20. Most likely she has ILD due to her  polymyositis. She has seen pulmonology   2. MTX was discontinued due to her pulmonary issues. She is now on CellCept currently 1500 mg PO BID. This is well tolerated.  3. She should ensure she is up to date with age appropriate cancer screening.   4. Check labs every 8-12 weeks to assess disease activity and monitor for medication toxicity.  Her most recent labs were done 7/14/25. No labs needed today. I will put lab orders in for future labs to be done mid September    5. Continue Rituximab.  She gets this every 6 months. She is asking for an updated PA for next January when she will be due next.   6. Follow up with us in 3-4 months.   7. Refill Cellcept   8. Her prognosis is fair  9. She is no longer on steroids.  10. She is doing well   Immunodeficiency due to drug therapy   IV Rituximab every  6 months for polymyositis  * 1st doses given 3/30/21 & 4/16/21  * 2nd doses given 10/27/21 & 11/10/21   * 3rd doses given 5/10/22 & 5/25/22  4th doses given 10/5/23 and 10/19/23   5th doses given 6/28/24 and 7/15/24  6/th doses given 1/15/25 and 1/29/25  7th doses given: 7/16/25 and scheduled for 7/30/25  Hold if the patient develops infection.   Avoid live vaccines while on this medication.   No recent serious infections  No infusion reactions  Also hold this medication perioperatively if the patient is going to have a surgical procedure  High risk medication use  * CellCept 1500 mg PO BID for polymyositis   1. CBC and CMP every 8-12 weeks to monitor for medication toxicity.   2. No recent serious infections.  3. Refill today  4. Tolerating well.   Primary osteoarthritis involving multiple joints  1. Tylenol PRN as directed is fine.  2. She has tried NSAIDs like Celebrex PRN  3. She also has tried taking glucosamine supplements  4. She has done some physical therapy     Assessment & Plan    Patient or patient representative verbalized consent for the use of Ambient Listening during the visit with  DARIN Agee  for chart documentation. 7/21/2025  11:22 EDT    Orders Placed This Encounter   Procedures    Comprehensive Metabolic Panel    C-reactive Protein    Sedimentation Rate    CBC (No Diff)    CK    Aldolase       New Medications Ordered This Visit   Medications    mycophenolate (CELLCEPT) 500 MG tablet     Sig: Take 3 tablets by mouth 2 (Two) Times a Day.     Dispense:  540 tablet     Refill:  1     I attest that the documentation was copied from a previous note but is still current and accurate.    Follow Up:   Return in about 4 months (around 11/21/2025) for Dr. Hopper, NP's.      DARIN Agee  [facility] [place]

## 2025-07-21 NOTE — ASSESSMENT & PLAN NOTE
* CellCept 1500 mg PO BID for polymyositis   1. CBC and CMP every 8-12 weeks to monitor for medication toxicity.   2. No recent serious infections.  3. Refill today  4. Tolerating well.

## 2025-07-21 NOTE — TELEPHONE ENCOUNTER
Prior authorization team will initiate closer to expiration of current auth to get reauthorization for 2026 and reach out if there are any issues with this.

## 2025-07-29 RX ORDER — DIPHENHYDRAMINE HYDROCHLORIDE 50 MG/ML
50 INJECTION, SOLUTION INTRAMUSCULAR; INTRAVENOUS AS NEEDED
Status: CANCELLED | OUTPATIENT
Start: 2025-07-30

## 2025-07-29 RX ORDER — FAMOTIDINE 10 MG/ML
20 INJECTION, SOLUTION INTRAVENOUS AS NEEDED
Status: CANCELLED | OUTPATIENT
Start: 2025-07-30

## 2025-07-29 RX ORDER — SODIUM CHLORIDE 9 MG/ML
20 INJECTION, SOLUTION INTRAVENOUS ONCE
Status: CANCELLED | OUTPATIENT
Start: 2025-07-30

## 2025-07-29 RX ORDER — ACETAMINOPHEN 325 MG/1
650 TABLET ORAL ONCE
Status: CANCELLED | OUTPATIENT
Start: 2025-07-30

## 2025-07-29 RX ORDER — LORATADINE 10 MG/1
10 TABLET ORAL ONCE
Status: CANCELLED
Start: 2025-07-30

## 2025-07-29 RX ORDER — METHYLPREDNISOLONE SODIUM SUCCINATE 125 MG/2ML
125 INJECTION INTRAMUSCULAR; INTRAVENOUS ONCE
Status: CANCELLED
Start: 2025-07-30

## 2025-07-30 ENCOUNTER — HOSPITAL ENCOUNTER (OUTPATIENT)
Dept: ONCOLOGY | Facility: HOSPITAL | Age: 75
Discharge: HOME | End: 2025-07-30
Admitting: INTERNAL MEDICINE
Payer: MEDICARE

## 2025-07-30 VITALS
WEIGHT: 179.7 LBS | BODY MASS INDEX: 29.94 KG/M2 | TEMPERATURE: 97.1 F | HEIGHT: 65 IN | HEART RATE: 62 BPM | DIASTOLIC BLOOD PRESSURE: 78 MMHG | RESPIRATION RATE: 18 BRPM | SYSTOLIC BLOOD PRESSURE: 118 MMHG

## 2025-07-30 DIAGNOSIS — M33.20 POLYMYOSITIS: Primary | ICD-10-CM

## 2025-07-30 PROCEDURE — A9270 NON-COVERED ITEM OR SERVICE: HCPCS | Performed by: INTERNAL MEDICINE

## 2025-07-30 PROCEDURE — 25010000002 METHYLPREDNISOLONE PER 125 MG: Performed by: INTERNAL MEDICINE

## 2025-07-30 PROCEDURE — 96375 TX/PRO/DX INJ NEW DRUG ADDON: CPT

## 2025-07-30 PROCEDURE — 25810000003 SODIUM CHLORIDE 0.9 % SOLUTION: Performed by: INTERNAL MEDICINE

## 2025-07-30 PROCEDURE — 25810000003 SODIUM CHLORIDE 0.9 % SOLUTION 250 ML FLEX CONT: Performed by: INTERNAL MEDICINE

## 2025-07-30 PROCEDURE — 25010000002 RITUXIMAB 10 MG/ML SOLUTION 50 ML VIAL: Performed by: INTERNAL MEDICINE

## 2025-07-30 PROCEDURE — 63710000001 CETIRIZINE 10 MG TABLET: Performed by: INTERNAL MEDICINE

## 2025-07-30 PROCEDURE — 96415 CHEMO IV INFUSION ADDL HR: CPT

## 2025-07-30 PROCEDURE — 63710000001 ACETAMINOPHEN 325 MG TABLET: Performed by: INTERNAL MEDICINE

## 2025-07-30 PROCEDURE — 96413 CHEMO IV INFUSION 1 HR: CPT

## 2025-07-30 RX ORDER — SODIUM CHLORIDE 9 MG/ML
20 INJECTION, SOLUTION INTRAVENOUS ONCE
OUTPATIENT
Start: 2025-07-30

## 2025-07-30 RX ORDER — FAMOTIDINE 10 MG/ML
20 INJECTION, SOLUTION INTRAVENOUS AS NEEDED
Status: DISCONTINUED | OUTPATIENT
Start: 2025-07-30 | End: 2025-07-31 | Stop reason: HOSPADM

## 2025-07-30 RX ORDER — LORATADINE 10 MG/1
10 TABLET ORAL ONCE
Start: 2025-07-30

## 2025-07-30 RX ORDER — METHYLPREDNISOLONE SODIUM SUCCINATE 125 MG/2ML
125 INJECTION INTRAMUSCULAR; INTRAVENOUS ONCE
Status: COMPLETED | OUTPATIENT
Start: 2025-07-30 | End: 2025-07-30

## 2025-07-30 RX ORDER — ACETAMINOPHEN 325 MG/1
650 TABLET ORAL ONCE
OUTPATIENT
Start: 2025-07-30

## 2025-07-30 RX ORDER — DIPHENHYDRAMINE HYDROCHLORIDE 50 MG/ML
50 INJECTION, SOLUTION INTRAMUSCULAR; INTRAVENOUS AS NEEDED
Status: DISCONTINUED | OUTPATIENT
Start: 2025-07-30 | End: 2025-07-31 | Stop reason: HOSPADM

## 2025-07-30 RX ORDER — METHYLPREDNISOLONE SODIUM SUCCINATE 125 MG/2ML
125 INJECTION INTRAMUSCULAR; INTRAVENOUS ONCE
Start: 2025-07-30

## 2025-07-30 RX ORDER — SODIUM CHLORIDE 9 MG/ML
20 INJECTION, SOLUTION INTRAVENOUS ONCE
Status: COMPLETED | OUTPATIENT
Start: 2025-07-30 | End: 2025-07-30

## 2025-07-30 RX ORDER — ACETAMINOPHEN 325 MG/1
650 TABLET ORAL ONCE
Status: COMPLETED | OUTPATIENT
Start: 2025-07-30 | End: 2025-07-30

## 2025-07-30 RX ORDER — DIPHENHYDRAMINE HYDROCHLORIDE 50 MG/ML
50 INJECTION, SOLUTION INTRAMUSCULAR; INTRAVENOUS AS NEEDED
OUTPATIENT
Start: 2025-07-30

## 2025-07-30 RX ORDER — FAMOTIDINE 10 MG/ML
20 INJECTION, SOLUTION INTRAVENOUS AS NEEDED
OUTPATIENT
Start: 2025-07-30

## 2025-07-30 RX ORDER — CETIRIZINE HYDROCHLORIDE 10 MG/1
10 TABLET ORAL ONCE
Status: COMPLETED | OUTPATIENT
Start: 2025-07-30 | End: 2025-07-30

## 2025-07-30 RX ADMIN — CETIRIZINE HYDROCHLORIDE 10 MG: 10 TABLET, FILM COATED ORAL at 08:31

## 2025-07-30 RX ADMIN — SODIUM CHLORIDE 20 ML/HR: 9 INJECTION, SOLUTION INTRAVENOUS at 09:01

## 2025-07-30 RX ADMIN — METHYLPREDNISOLONE SODIUM SUCCINATE 125 MG: 125 INJECTION, POWDER, FOR SOLUTION INTRAMUSCULAR; INTRAVENOUS at 08:33

## 2025-07-30 RX ADMIN — ACETAMINOPHEN 650 MG: 325 TABLET ORAL at 08:31

## 2025-07-30 RX ADMIN — SODIUM CHLORIDE 1000 MG: 9 INJECTION, SOLUTION INTRAVENOUS at 09:05

## 2025-08-08 ENCOUNTER — OFFICE VISIT (OUTPATIENT)
Dept: PULMONOLOGY | Facility: CLINIC | Age: 75
End: 2025-08-08
Payer: MEDICARE

## 2025-08-08 VITALS
OXYGEN SATURATION: 97 % | SYSTOLIC BLOOD PRESSURE: 140 MMHG | BODY MASS INDEX: 29.82 KG/M2 | DIASTOLIC BLOOD PRESSURE: 90 MMHG | HEART RATE: 73 BPM | WEIGHT: 179 LBS | HEIGHT: 65 IN

## 2025-08-08 DIAGNOSIS — M33.20 POLYMYOSITIS: ICD-10-CM

## 2025-08-08 DIAGNOSIS — Z86.16 HISTORY OF COVID-19: ICD-10-CM

## 2025-08-08 DIAGNOSIS — J84.9 ILD (INTERSTITIAL LUNG DISEASE): Primary | ICD-10-CM

## 2025-08-08 DIAGNOSIS — R91.8 GROUND GLASS OPACITY PRESENT ON IMAGING OF LUNG: ICD-10-CM

## 2025-08-08 DIAGNOSIS — Z87.891 FORMER SMOKER: ICD-10-CM
